# Patient Record
Sex: FEMALE | Race: WHITE | NOT HISPANIC OR LATINO | Employment: OTHER | URBAN - METROPOLITAN AREA
[De-identification: names, ages, dates, MRNs, and addresses within clinical notes are randomized per-mention and may not be internally consistent; named-entity substitution may affect disease eponyms.]

---

## 2017-10-12 ENCOUNTER — APPOINTMENT (OUTPATIENT)
Dept: WOMENS IMAGING | Facility: HOSPITAL | Age: 67
End: 2017-10-12

## 2017-10-12 PROCEDURE — G0202 SCR MAMMO BI INCL CAD: HCPCS | Performed by: RADIOLOGY

## 2017-10-12 PROCEDURE — 77063 BREAST TOMOSYNTHESIS BI: CPT | Performed by: RADIOLOGY

## 2017-10-12 PROCEDURE — MDREVIEWSP: Performed by: RADIOLOGY

## 2018-10-16 ENCOUNTER — APPOINTMENT (OUTPATIENT)
Dept: WOMENS IMAGING | Facility: HOSPITAL | Age: 68
End: 2018-10-16

## 2018-10-16 PROCEDURE — MDREVIEWSP: Performed by: RADIOLOGY

## 2018-10-16 PROCEDURE — 77063 BREAST TOMOSYNTHESIS BI: CPT | Performed by: RADIOLOGY

## 2018-10-16 PROCEDURE — 77067 SCR MAMMO BI INCL CAD: CPT | Performed by: RADIOLOGY

## 2018-10-16 PROCEDURE — 77080 DXA BONE DENSITY AXIAL: CPT | Performed by: RADIOLOGY

## 2019-10-17 ENCOUNTER — APPOINTMENT (OUTPATIENT)
Dept: WOMENS IMAGING | Facility: HOSPITAL | Age: 69
End: 2019-10-17

## 2019-10-17 PROCEDURE — 77063 BREAST TOMOSYNTHESIS BI: CPT | Performed by: RADIOLOGY

## 2019-10-17 PROCEDURE — 77067 SCR MAMMO BI INCL CAD: CPT | Performed by: RADIOLOGY

## 2019-10-17 PROCEDURE — MDREVIEWSP: Performed by: RADIOLOGY

## 2019-10-30 ENCOUNTER — TRANSCRIBE ORDERS (OUTPATIENT)
Dept: ADMINISTRATIVE | Facility: HOSPITAL | Age: 69
End: 2019-10-30

## 2019-10-30 DIAGNOSIS — M54.50 LOW BACK PAIN, UNSPECIFIED BACK PAIN LATERALITY, UNSPECIFIED CHRONICITY, UNSPECIFIED WHETHER SCIATICA PRESENT: Primary | ICD-10-CM

## 2019-10-30 DIAGNOSIS — N18.30 CKD (CHRONIC KIDNEY DISEASE), STAGE III (HCC): ICD-10-CM

## 2019-11-04 ENCOUNTER — APPOINTMENT (OUTPATIENT)
Dept: ULTRASOUND IMAGING | Facility: HOSPITAL | Age: 69
End: 2019-11-04

## 2019-11-18 ENCOUNTER — TRANSCRIBE ORDERS (OUTPATIENT)
Dept: ADMINISTRATIVE | Facility: HOSPITAL | Age: 69
End: 2019-11-18

## 2019-11-18 ENCOUNTER — LAB (OUTPATIENT)
Dept: LAB | Facility: HOSPITAL | Age: 69
End: 2019-11-18

## 2019-11-18 DIAGNOSIS — D72.9 WHITE BLOOD CELL DISORDER: Primary | ICD-10-CM

## 2019-11-18 LAB
BASOPHILS # BLD AUTO: 0.09 10*3/MM3 (ref 0–0.2)
BASOPHILS NFR BLD AUTO: 1.1 % (ref 0–1.5)
DEPRECATED RDW RBC AUTO: 42.5 FL (ref 37–54)
EOSINOPHIL # BLD AUTO: 0.43 10*3/MM3 (ref 0–0.4)
EOSINOPHIL NFR BLD AUTO: 5.3 % (ref 0.3–6.2)
ERYTHROCYTE [DISTWIDTH] IN BLOOD BY AUTOMATED COUNT: 12.7 % (ref 12.3–15.4)
HCT VFR BLD AUTO: 43.3 % (ref 34–46.6)
HGB BLD-MCNC: 14.7 G/DL (ref 12–15.9)
IMM GRANULOCYTES # BLD AUTO: 0.06 10*3/MM3 (ref 0–0.05)
IMM GRANULOCYTES NFR BLD AUTO: 0.7 % (ref 0–0.5)
LYMPHOCYTES # BLD AUTO: 2.67 10*3/MM3 (ref 0.7–3.1)
LYMPHOCYTES NFR BLD AUTO: 33 % (ref 19.6–45.3)
MCH RBC QN AUTO: 30.9 PG (ref 26.6–33)
MCHC RBC AUTO-ENTMCNC: 33.9 G/DL (ref 31.5–35.7)
MCV RBC AUTO: 91.2 FL (ref 79–97)
MONOCYTES # BLD AUTO: 0.74 10*3/MM3 (ref 0.1–0.9)
MONOCYTES NFR BLD AUTO: 9.1 % (ref 5–12)
NEUTROPHILS # BLD AUTO: 4.1 10*3/MM3 (ref 1.7–7)
NEUTROPHILS NFR BLD AUTO: 50.8 % (ref 42.7–76)
NRBC BLD AUTO-RTO: 0 /100 WBC (ref 0–0.2)
PLATELET # BLD AUTO: 252 10*3/MM3 (ref 140–450)
PMV BLD AUTO: 9.8 FL (ref 6–12)
RBC # BLD AUTO: 4.75 10*6/MM3 (ref 3.77–5.28)
WBC NRBC COR # BLD: 8.09 10*3/MM3 (ref 3.4–10.8)

## 2019-11-18 PROCEDURE — 36415 COLL VENOUS BLD VENIPUNCTURE: CPT

## 2019-11-18 PROCEDURE — 85025 COMPLETE CBC W/AUTO DIFF WBC: CPT

## 2020-06-18 ENCOUNTER — TRANSCRIBE ORDERS (OUTPATIENT)
Dept: ADMINISTRATIVE | Facility: HOSPITAL | Age: 70
End: 2020-06-18

## 2020-06-18 DIAGNOSIS — G51.0 BELL'S PALSY: Primary | ICD-10-CM

## 2020-06-19 ENCOUNTER — HOSPITAL ENCOUNTER (OUTPATIENT)
Dept: MRI IMAGING | Facility: HOSPITAL | Age: 70
Discharge: HOME | End: 2020-06-19
Admitting: INTERNAL MEDICINE

## 2020-06-19 DIAGNOSIS — G51.0 BELL'S PALSY: ICD-10-CM

## 2020-06-19 PROCEDURE — 0 GADOBENATE DIMEGLUMINE 529 MG/ML SOLUTION: Performed by: INTERNAL MEDICINE

## 2020-06-19 PROCEDURE — A9577 INJ MULTIHANCE: HCPCS | Performed by: INTERNAL MEDICINE

## 2020-06-19 PROCEDURE — 70553 MRI BRAIN STEM W/O & W/DYE: CPT

## 2020-06-19 RX ADMIN — GADOBENATE DIMEGLUMINE 17 ML: 529 INJECTION, SOLUTION INTRAVENOUS at 12:52

## 2020-06-19 NOTE — NURSING NOTE
"1316 patient to triage bay 9, compliant with her mask, I am wearing a mask and eye protection to care for her. She is a hold and call patient here.  1343 dr. Blanchard phoned here and stated \"nothing acute, it is bells palsy, ok to go\" per Miriam Camargo, Rn  1345 IV out by Addie Kwong RN, patient ambulatory toward discharge now.  "

## 2021-03-03 DIAGNOSIS — Z23 IMMUNIZATION DUE: ICD-10-CM

## 2021-05-26 ENCOUNTER — APPOINTMENT (OUTPATIENT)
Dept: WOMENS IMAGING | Facility: HOSPITAL | Age: 71
End: 2021-05-26

## 2021-05-26 PROCEDURE — 77067 SCR MAMMO BI INCL CAD: CPT | Performed by: RADIOLOGY

## 2021-05-26 PROCEDURE — 77063 BREAST TOMOSYNTHESIS BI: CPT | Performed by: RADIOLOGY

## 2022-01-21 ENCOUNTER — OFFICE VISIT (OUTPATIENT)
Dept: SPORTS MEDICINE | Facility: CLINIC | Age: 72
End: 2022-01-21

## 2022-01-21 VITALS
HEART RATE: 72 BPM | BODY MASS INDEX: 28.32 KG/M2 | OXYGEN SATURATION: 95 % | DIASTOLIC BLOOD PRESSURE: 60 MMHG | TEMPERATURE: 97.8 F | WEIGHT: 170 LBS | RESPIRATION RATE: 16 BRPM | SYSTOLIC BLOOD PRESSURE: 128 MMHG | HEIGHT: 65 IN

## 2022-01-21 DIAGNOSIS — G89.29 CHRONIC PAIN OF RIGHT KNEE: Primary | ICD-10-CM

## 2022-01-21 DIAGNOSIS — M25.561 CHRONIC PAIN OF RIGHT KNEE: Primary | ICD-10-CM

## 2022-01-21 DIAGNOSIS — M17.11 PRIMARY OSTEOARTHRITIS OF RIGHT KNEE: ICD-10-CM

## 2022-01-21 PROCEDURE — 73562 X-RAY EXAM OF KNEE 3: CPT | Performed by: FAMILY MEDICINE

## 2022-01-21 PROCEDURE — 99214 OFFICE O/P EST MOD 30 MIN: CPT | Performed by: FAMILY MEDICINE

## 2022-01-21 NOTE — PROGRESS NOTES
"Tiff is a 71 y.o. year old female presents to [facility]    Chief Complaint   Patient presents with   • Knee Pain     eval for RT knee pain with NKI - pain for about 5-6 weeks - pain is on and off at times, worse when trying to get up from sitting and prolonged ambulation - no other sxs reported - here today with new x-rays for further evaluation and treatment        History of Present Illness  Onset approximately 6 weeks ago.  She does have chronic right knee pain but this acutely exacerbated over the past 6 weeks.  She states that she was seen by [facility] a few years ago and was told she had arthritis.  She since then has been on and off taking meloxicam, more so recently since the flareup of her pain.  She noticed stiffness and pain below the kneecap especially when she bends her knee over nighttime.  When she gets it moving, she states that her symptoms resolved.  She does not endorse any true instability though occasionally does associate some relative instability.  No swelling.    I have reviewed the patient's medical, family, and social history in detail and updated the computerized patient record.    /60 (BP Location: Left arm, Patient Position: Sitting, Cuff Size: Adult)   Pulse 72   Temp 97.8 °F (36.6 °C) (Temporal)   Resp 16   Ht 165.1 cm (65\")   Wt 77.1 kg (170 lb)   SpO2 95%   BMI 28.29 kg/m²      Physical Exam    Mask worn thru encounter  Vital signs reviewed.   General: No acute distress.  Eyes: conjunctiva clear; pupils equally round and reactive  ENT: external ears atraumatic  CV: no peripheral edema  Resp: normal respiratory effort, no use of accessory muscles  Skin: no rashes or wounds; normal turgor  Psych: mood and affect appropriate; recent and remote memory intact  Neuro: sensation to light touch intact    MSK Exam  R knee: No effusion.  Full range of motion.  Positive retropatellar crepitus.  Negative medial, negative lateral [place].  " Pseudolaxity with valgus stress though there is good endpoint.  There is no laxity with varus stress.    Right Knee X-Ray  Indication: Pain    Views: AP, Lateral, and [place]    Findings:  No fracture  No bony lesion  Normal soft tissues  Patellofemoral, medial OA with osteophyte formation along the superior pole the patella as well as along the medial femoral condyle, medial tibial plateau    No prior studies were available for comparison.           Diagnoses and all orders for this visit:    Chronic pain of right knee  -     XR Knee 3+ View With [place] Right    Primary osteoarthritis of right knee    Clinically presents with knee osteoarthritis.  Discussed x-ray findings with patient.  She seems to be doing well functionally and pain is controlled with as needed meloxicam.  This is managed by her primary care.  She will continue with this treatment but if anything changes, she can RTO for further options.      Follow Up   Return if symptoms worsen or fail to improve.  Patient was given instructions and counseling regarding her condition or for health maintenance advice. Please see specific information pulled into the AVS if appropriate.     EMR Dragon/Transcription disclaimer:    Much of this encounter note is an electronic transcription/translation of spoken language to printed text.  The electronic translation of spoken language may permit erroneous, or at times, nonsensical words or phrases to be inadvertently transcribed.  Although I have reviewed the note for such errors some may still exist.    [] : Resident [FreeTextEntry3] : 2nd dose of PNC G\par RTC in one week\par \par The need for partner treatment before restarting sexual activity discussed again

## 2022-07-26 ENCOUNTER — TREATMENT (OUTPATIENT)
Dept: PHYSICAL THERAPY | Facility: CLINIC | Age: 72
End: 2022-07-26

## 2022-07-26 DIAGNOSIS — M25.571 ACUTE RIGHT ANKLE PAIN: ICD-10-CM

## 2022-07-26 DIAGNOSIS — R26.9 GAIT DISTURBANCE: ICD-10-CM

## 2022-07-26 DIAGNOSIS — Z87.81 STATUS POST OPEN REDUCTION WITH INTERNAL FIXATION (ORIF) OF FRACTURE OF ANKLE: ICD-10-CM

## 2022-07-26 DIAGNOSIS — S93.431A ANKLE SYNDESMOSIS DISRUPTION, RIGHT, INITIAL ENCOUNTER: Primary | ICD-10-CM

## 2022-07-26 DIAGNOSIS — Z98.890 STATUS POST OPEN REDUCTION WITH INTERNAL FIXATION (ORIF) OF FRACTURE OF ANKLE: ICD-10-CM

## 2022-07-26 PROCEDURE — 97162 PT EVAL MOD COMPLEX 30 MIN: CPT | Performed by: PHYSICAL THERAPIST

## 2022-07-26 PROCEDURE — 97140 MANUAL THERAPY 1/> REGIONS: CPT | Performed by: PHYSICAL THERAPIST

## 2022-07-26 PROCEDURE — 97110 THERAPEUTIC EXERCISES: CPT | Performed by: PHYSICAL THERAPIST

## 2022-07-26 NOTE — PROGRESS NOTES
Physical Therapy Initial Evaluation and Plan of Care    Patient: Tiff See   : 1950  Diagnosis/ICD-10 Code:  Ankle syndesmosis disruption, right, initial encounter [S93.431A]  Referring practitioner: No ref. provider found  Date of Initial Visit: 2022  Today's Date: 2022  Patient seen for 1 sessions           Subjective Questionnaire: LEFS:       Subjective Evaluation    History of Present Illness  Date of surgery: 6/3/2022  Mechanism of injury: Pt is a 73 y/o female who presents to physical therapy with c/o R ankle pain s/p R ankle ORIF with external fixation. Pt had external fixation removed and presents to PT in a wheelchair and a boot. Pt is currently NWB.     Pain  Current pain ratin  At worst pain ratin  Quality: dull ache  Relieving factors: rest and ice  Aggravating factors: movement, standing, ambulation, stairs and squatting    Social Support  Lives with: spouse    Treatments  Current treatment: physical therapy  Patient Goals  Patient goals for therapy: decreased edema, decreased pain, improved balance, increased motion, increased strength, independence with ADLs/IADLs and return to work             Objective          Passive Range of Motion   Left Ankle/Foot    Dorsiflexion (ke): 15 degrees   Plantar flexion: 40 degrees   Inversion: 48 degrees   Eversion: 35 degrees     Right Ankle/Foot    Dorsiflexion (ke): 8 degrees with pain  Plantar flexion: 35 degrees   Inversion: 30 degrees   Eversion: 10 degrees with pain    Strength/Myotome Testing     Left Hip   Planes of Motion   Flexion: 4-    Right Hip   Planes of Motion   Flexion: 4-    Left Knee   Flexion: 4+  Extension: 4-    Right Knee   Flexion: 4+  Extension: 4-    Left Ankle/Foot   Dorsiflexion: 5  Plantar flexion: 5  Inversion: 5  Eversion: 5    Additional Strength Details  RLE MMT DNT will test when appropriate    Ambulation   Weight-Bearing Status   Weight-Bearing Status (Left): full weight bearing    Weight-Bearing Status (Right): non-weight-bearing    Assistive device used: wheelchair    Ambulation: Level Surfaces   Ambulation with assistive device: unable  Ambulation without assistive device: unable    Ambulation: Stairs   Ascend stairs: unable  Descend stairs: unable          Assessment & Plan     Assessment  Impairments: abnormal gait, abnormal muscle tone, abnormal or restricted ROM, activity intolerance, impaired balance, impaired physical strength, lacks appropriate home exercise program, pain with function and weight-bearing intolerance  Functional Limitations: carrying objects, lifting, walking, uncomfortable because of pain, standing and unable to perform repetitive tasks  Assessment details: Pt is a 71 y/o female who presents to physical therapy with signs and symptoms appropriate at this stage s/p R ankle ORIF.  Pt has decreased R ankle ROM compared to unaffected side. In addition, pt is NWB which limits her ability to perform her ADLs/IADLs pain free. Pt has pain and is unable to ambulate, and transfer due to post op restrictions. Pt would benefit from skilled physical therapy in order to address the above impairments and activity limitations outlined in this initial evaluation, in order to decrease pain, improve functional mobility, and return to PLOF.   Prognosis: good    Goals  Plan Goals: STG 2-4 weeks    1. LEFS score to increase by 10 points in order to show improvement in overall functional activities.   2. Pt will be able to improve ankle DF ROM to 12 degrees in order to progress mobility for ambulation in loading  3. Pt will be independent in home exercise program    LTG 4-6 weeks    1. Pt will have normal R ankle PROM in order to improve functional mobility  2. Pt will be able to ambulate with FWW 25 feet with good form and stability with step through gait pattern  3. Pt will be able to perform 5TSTS using hands in <30 seconds      Plan  Therapy options: will be seen for skilled therapy  services  Planned modality interventions: cryotherapy  Planned therapy interventions: balance/weight-bearing training, flexibility, functional ROM exercises, ADL retraining, home exercise program, gait training, IADL retraining, joint mobilization, manual therapy, neuromuscular re-education, soft tissue mobilization, strengthening, stretching and therapeutic activities  Duration in weeks: 12  Treatment plan discussed with: patient        Manual Therapy:    8     mins  79809;  Therapeutic Exercise:    18     mins  30603;     Neuromuscular Robb:    0    mins  73460;    Therapeutic Activity:     0     mins  20634;     Gait Trainin     mins  34175;     Ultrasound:     0     mins  17442;    Electrical Stimulation:    0     mins  88449 ( );  Dry Needling     0     mins self-pay    Timed Treatment:   26   mins   Total Treatment:     45   mins    PT SIGNATURE: Valery Avila, GOOD   DATE TREATMENT INITIATED: 2022    Initial Certification  Certification Period: 10/25/2022  I certify that the therapy services are furnished while this patient is under my care.  The services outlined above are required by this patient, and will be reviewed every 90 days.       Please sign and return via fax to [phone].. Thank you, [facility].

## 2022-07-28 ENCOUNTER — TELEPHONE (OUTPATIENT)
Dept: URGENT CARE | Facility: CLINIC | Age: 72
End: 2022-07-28

## 2022-08-02 ENCOUNTER — TREATMENT (OUTPATIENT)
Dept: PHYSICAL THERAPY | Facility: CLINIC | Age: 72
End: 2022-08-02

## 2022-08-02 DIAGNOSIS — M25.571 ACUTE RIGHT ANKLE PAIN: ICD-10-CM

## 2022-08-02 DIAGNOSIS — Z87.81 STATUS POST OPEN REDUCTION WITH INTERNAL FIXATION (ORIF) OF FRACTURE OF ANKLE: ICD-10-CM

## 2022-08-02 DIAGNOSIS — S93.431A ANKLE SYNDESMOSIS DISRUPTION, RIGHT, INITIAL ENCOUNTER: Primary | ICD-10-CM

## 2022-08-02 DIAGNOSIS — R26.9 GAIT DISTURBANCE: ICD-10-CM

## 2022-08-02 DIAGNOSIS — Z98.890 STATUS POST OPEN REDUCTION WITH INTERNAL FIXATION (ORIF) OF FRACTURE OF ANKLE: ICD-10-CM

## 2022-08-02 PROCEDURE — 97110 THERAPEUTIC EXERCISES: CPT | Performed by: PHYSICAL THERAPIST

## 2022-08-02 PROCEDURE — 97112 NEUROMUSCULAR REEDUCATION: CPT | Performed by: PHYSICAL THERAPIST

## 2022-08-02 PROCEDURE — 97140 MANUAL THERAPY 1/> REGIONS: CPT | Performed by: PHYSICAL THERAPIST

## 2022-08-02 NOTE — PROGRESS NOTES
Physical Therapy Daily Progress Note        Patient: Tiff See   : 1950  Diagnosis/ICD-10 Code:  Ankle syndesmosis disruption, right, initial encounter [S93.431A]  Referring practitioner: Spenser Stevens MD  Date of Initial Visit: Type: THERAPY  Noted: 2022  Today's Date: 2022  Patient seen for 2 sessions         Tiff See reports:         Subjective   Pt states that her ankle is doing okay, that it is a little stiff  Objective   See Exercise, Manual, and Modality Logs for complete treatment.       Assessment/Plan  Pt instructed to ice at home after performing home exercises and to elevate LE. Pt showed good understanding of HEP and guidelines. Pt did a nice job of supine activities and is following NWB protocol. Pt sees MD in one week for follow up. Continue POC in order to strengthen LE in NWB, as well as, improve overall ankle mobility. Continue POC.  Progress per Plan of Care           Manual Therapy:    8     mins  27761;  Therapeutic Exercise:    20     mins  60557;     Neuromuscular Robb:    12    mins  63079;    Therapeutic Activity:     0     mins  04378;     Gait Trainin     mins  89585;     Ultrasound:     0     mins  08246;    Electrical Stimulation:    0     mins  63901 ( );  Dry Needling     0     mins self-pay    Timed Treatment:   40   mins   Total Treatment:     50   mins    Valery Avila PT  Physical Therapist

## 2022-08-04 ENCOUNTER — TREATMENT (OUTPATIENT)
Dept: PHYSICAL THERAPY | Facility: CLINIC | Age: 72
End: 2022-08-04

## 2022-08-04 DIAGNOSIS — R26.9 GAIT DISTURBANCE: ICD-10-CM

## 2022-08-04 DIAGNOSIS — M25.571 ACUTE RIGHT ANKLE PAIN: ICD-10-CM

## 2022-08-04 DIAGNOSIS — Z87.81 STATUS POST OPEN REDUCTION WITH INTERNAL FIXATION (ORIF) OF FRACTURE OF ANKLE: ICD-10-CM

## 2022-08-04 DIAGNOSIS — Z98.890 STATUS POST OPEN REDUCTION WITH INTERNAL FIXATION (ORIF) OF FRACTURE OF ANKLE: ICD-10-CM

## 2022-08-04 DIAGNOSIS — S93.431A ANKLE SYNDESMOSIS DISRUPTION, RIGHT, INITIAL ENCOUNTER: Primary | ICD-10-CM

## 2022-08-04 PROCEDURE — 97112 NEUROMUSCULAR REEDUCATION: CPT | Performed by: PHYSICAL THERAPIST

## 2022-08-04 PROCEDURE — 97110 THERAPEUTIC EXERCISES: CPT | Performed by: PHYSICAL THERAPIST

## 2022-08-04 PROCEDURE — 97140 MANUAL THERAPY 1/> REGIONS: CPT | Performed by: PHYSICAL THERAPIST

## 2022-08-04 NOTE — PROGRESS NOTES
"   Physical Therapy Daily Progress Note        Patient: Tiff See   : 1950  Diagnosis/ICD-10 Code:  Ankle syndesmosis disruption, right, initial encounter [S93.431A]  Referring practitioner: Spenser Stevnes MD  Date of Initial Visit: Type: THERAPY  Noted: 2022  Today's Date: 2022  Patient seen for 3 sessions         Tiff See reports:         Subjective   Pt states \"I was sore after last time, but just the day after\"  Objective   See Exercise, Manual, and Modality Logs for complete treatment.       Assessment/Plan  Pt tolerates treatment well. Pt progressed in gentle strengthening with ankle four-way activity. Pt required verbal cueing to lead with toes during ankle inversion/eversion to prevent hip rotation compensation. Pt able to correct with cue, has the most difficulty with resisted eversion. Pt reported no pain throughout activity. Continue to progress towards goals.   Progress per Plan of Care           Manual Therapy:    8     mins  68901;  Therapeutic Exercise:    20     mins  87475;     Neuromuscular Robb:    15    mins  12260;    Therapeutic Activity:     0     mins  77194;     Gait Trainin     mins  91132;     Ultrasound:     0     mins  65961;    Electrical Stimulation:    0     mins  15120 ( );  Dry Needling     0     mins self-pay    Timed Treatment:   43   mins   Total Treatment:     51   mins    Valery Avila PT  Physical Therapist                    "

## 2022-08-11 ENCOUNTER — TREATMENT (OUTPATIENT)
Dept: PHYSICAL THERAPY | Facility: CLINIC | Age: 72
End: 2022-08-11

## 2022-08-11 DIAGNOSIS — R26.9 GAIT DISTURBANCE: ICD-10-CM

## 2022-08-11 DIAGNOSIS — Z87.81 STATUS POST OPEN REDUCTION WITH INTERNAL FIXATION (ORIF) OF FRACTURE OF ANKLE: ICD-10-CM

## 2022-08-11 DIAGNOSIS — S93.431A ANKLE SYNDESMOSIS DISRUPTION, RIGHT, INITIAL ENCOUNTER: Primary | ICD-10-CM

## 2022-08-11 DIAGNOSIS — M25.571 ACUTE RIGHT ANKLE PAIN: ICD-10-CM

## 2022-08-11 DIAGNOSIS — Z98.890 STATUS POST OPEN REDUCTION WITH INTERNAL FIXATION (ORIF) OF FRACTURE OF ANKLE: ICD-10-CM

## 2022-08-11 PROCEDURE — 97530 THERAPEUTIC ACTIVITIES: CPT | Performed by: PHYSICAL THERAPIST

## 2022-08-11 PROCEDURE — 97110 THERAPEUTIC EXERCISES: CPT | Performed by: PHYSICAL THERAPIST

## 2022-08-11 PROCEDURE — 97116 GAIT TRAINING THERAPY: CPT | Performed by: PHYSICAL THERAPIST

## 2022-08-11 NOTE — PROGRESS NOTES
Physical Therapy Daily Progress Note    Patient: Tiff See   : 1950  Diagnosis/ICD-10 Code:  Ankle syndesmosis disruption, right, initial encounter [S93.431A]  Referring practitioner: Spenser Stevens MD  Date of Initial Visit: Type: THERAPY  Noted: 2022  Today's Date: 2022  Patient seen for 4 sessions         Tiff See reports: I went to the doctor this week and got new orders. I am able to start weightbearing. (Order scanned in chart). Not having any pain anymore, just feeling tightness.    Subjective     Objective   See Exercise, Manual, and Modality Logs for complete treatment.       Assessment/Plan  Subjectively, pt reports no increase of pain or discomfort with interventions performed today. Performed well with addition of weight shift activities with boot on in parallel bars. Continues to demonstrate limited ankle mobility especially in DF and EV planes. Continues to benefit from verbal/tactile cues to ensure proper form and technique for exercise performance.     Progress per Plan of Care           Manual Therapy:         mins  72058;  Therapeutic Exercise:    25     mins  06148;     Neuromuscular Robb:        mins  75638;    Therapeutic Activity:     15     mins  75010;     Gait Trainin     mins  69313;     Ultrasound:          mins  63852;    Electrical Stimulation:         mins  02453 ( );  Dry Needling          mins self-pay    Timed Treatment:  48    mins   Total Treatment:     53   mins    Darlin Culp PTA  Physical Therapist Assistant [id number]

## 2022-08-17 ENCOUNTER — TREATMENT (OUTPATIENT)
Dept: PHYSICAL THERAPY | Facility: CLINIC | Age: 72
End: 2022-08-17

## 2022-08-17 DIAGNOSIS — Z98.890 STATUS POST OPEN REDUCTION WITH INTERNAL FIXATION (ORIF) OF FRACTURE OF ANKLE: ICD-10-CM

## 2022-08-17 DIAGNOSIS — Z87.81 STATUS POST OPEN REDUCTION WITH INTERNAL FIXATION (ORIF) OF FRACTURE OF ANKLE: ICD-10-CM

## 2022-08-17 DIAGNOSIS — R26.9 GAIT DISTURBANCE: ICD-10-CM

## 2022-08-17 DIAGNOSIS — M25.571 ACUTE RIGHT ANKLE PAIN: ICD-10-CM

## 2022-08-17 DIAGNOSIS — S93.431A ANKLE SYNDESMOSIS DISRUPTION, RIGHT, INITIAL ENCOUNTER: Primary | ICD-10-CM

## 2022-08-17 PROCEDURE — 97110 THERAPEUTIC EXERCISES: CPT | Performed by: PHYSICAL THERAPIST

## 2022-08-17 PROCEDURE — 97140 MANUAL THERAPY 1/> REGIONS: CPT | Performed by: PHYSICAL THERAPIST

## 2022-08-17 PROCEDURE — 97116 GAIT TRAINING THERAPY: CPT | Performed by: PHYSICAL THERAPIST

## 2022-08-17 NOTE — PROGRESS NOTES
"   Physical Therapy Daily Progress Note        Patient: Tiff See   : 1950  Diagnosis/ICD-10 Code:  Ankle syndesmosis disruption, right, initial encounter [S93.431A]  Referring practitioner: Spenser Stevens MD  Date of Initial Visit: Type: THERAPY  Noted: 2022  Today's Date: 2022  Patient seen for 5 sessions         Tiff See reports:         Subjective   Pt states \"It is feeling ok today, I was sore after last time\"  Objective   See Exercise, Manual, and Modality Logs for complete treatment.       Assessment/Plan  Pt brought cane to today's session. Pt educated on how to safely ambulate with her SPC while wearing CAM boot in parallel bars. Pt educated on proper step to pattern using cane. Pt has apprehension putting weight through boot, instructed for small transfers a couple of steps to the bathroom to use walker until she improves with her cane in PT. HEP updated to include supine bridges and resisted clamshells. Continue POC.   Progress per Plan of Care           Manual Therapy:    10     mins  95729;  Therapeutic Exercise:    20     mins  65193;     Neuromuscular Robb:    0    mins  14598;    Therapeutic Activity:     10     mins  18785;     Gait Trainin     mins  31303;     Ultrasound:     0     mins  66856;    Electrical Stimulation:    0     mins  79775 ( );  Dry Needling     0     mins self-pay    Timed Treatment:   45   mins   Total Treatment:     45   mins    Valery Avila, PT  Physical Therapist                    "

## 2022-08-22 ENCOUNTER — TREATMENT (OUTPATIENT)
Dept: PHYSICAL THERAPY | Facility: CLINIC | Age: 72
End: 2022-08-22

## 2022-08-22 DIAGNOSIS — R26.9 GAIT DISTURBANCE: ICD-10-CM

## 2022-08-22 DIAGNOSIS — Z87.81 STATUS POST OPEN REDUCTION WITH INTERNAL FIXATION (ORIF) OF FRACTURE OF ANKLE: ICD-10-CM

## 2022-08-22 DIAGNOSIS — M25.571 ACUTE RIGHT ANKLE PAIN: ICD-10-CM

## 2022-08-22 DIAGNOSIS — Z98.890 STATUS POST OPEN REDUCTION WITH INTERNAL FIXATION (ORIF) OF FRACTURE OF ANKLE: ICD-10-CM

## 2022-08-22 DIAGNOSIS — S93.431A ANKLE SYNDESMOSIS DISRUPTION, RIGHT, INITIAL ENCOUNTER: Primary | ICD-10-CM

## 2022-08-22 PROCEDURE — 97530 THERAPEUTIC ACTIVITIES: CPT | Performed by: PHYSICAL THERAPIST

## 2022-08-22 PROCEDURE — 97110 THERAPEUTIC EXERCISES: CPT | Performed by: PHYSICAL THERAPIST

## 2022-08-22 PROCEDURE — 97140 MANUAL THERAPY 1/> REGIONS: CPT | Performed by: PHYSICAL THERAPIST

## 2022-08-22 NOTE — PROGRESS NOTES
"   Physical Therapy Daily Progress Note        Patient: Tiff See   : 1950  Diagnosis/ICD-10 Code:  Ankle syndesmosis disruption, right, initial encounter [S93.431A]  Referring practitioner: Spenser Stevens MD  Date of Initial Visit: Type: THERAPY  Noted: 2022  Today's Date: 2022  Patient seen for 6 sessions         Tiff See reports:         Subjective   Pt states \"I have been trying to put more weight through it\"  Objective   See Exercise, Manual, and Modality Logs for complete treatment.       Assessment/Plan  Pt continues to show improvement in overall WB tolerance during standing activities in CAM boot. During stagger weight shift, she had the most difficulty during loading due to c/o R knee discomfort when R foot as lead leg. Continue to progress WB and progress strengthening as tolerated.   Progress per Plan of Care           Manual Therapy:    15     mins  00885;  Therapeutic Exercise:    15     mins  87503;     Neuromuscular Robb:    0    mins  21586;    Therapeutic Activity:     10     mins  48417;     Gait Trainin     mins  94869;     Ultrasound:     0     mins  86684;    Electrical Stimulation:    0     mins  29779 ( );  Dry Needling     0     mins self-pay    Timed Treatment:   45   mins   Total Treatment:     45   mins    Valery Avila, PT  Physical Therapist                    "

## 2022-08-24 ENCOUNTER — TRANSCRIBE ORDERS (OUTPATIENT)
Dept: WOMENS IMAGING | Facility: HOSPITAL | Age: 72
End: 2022-08-24

## 2022-08-24 ENCOUNTER — HOSPITAL ENCOUNTER (OUTPATIENT)
Dept: PET IMAGING | Facility: HOSPITAL | Age: 72
Discharge: HOME | End: 2022-08-24

## 2022-08-24 ENCOUNTER — APPOINTMENT (OUTPATIENT)
Dept: WOMENS IMAGING | Facility: HOSPITAL | Age: 72
End: 2022-08-24

## 2022-08-24 DIAGNOSIS — M81.0 HIGH RISK FOR FRACTURE DUE TO OSTEOPOROSIS BY DEXA SCAN: Primary | ICD-10-CM

## 2022-08-24 PROCEDURE — 77080 DXA BONE DENSITY AXIAL: CPT

## 2022-08-24 PROCEDURE — 77067 SCR MAMMO BI INCL CAD: CPT | Performed by: RADIOLOGY

## 2022-08-24 PROCEDURE — 77080 DXA BONE DENSITY AXIAL: CPT | Performed by: RADIOLOGY

## 2022-08-24 PROCEDURE — 77063 BREAST TOMOSYNTHESIS BI: CPT | Performed by: RADIOLOGY

## 2022-08-25 ENCOUNTER — TREATMENT (OUTPATIENT)
Dept: PHYSICAL THERAPY | Facility: CLINIC | Age: 72
End: 2022-08-25

## 2022-08-25 DIAGNOSIS — R26.9 GAIT DISTURBANCE: ICD-10-CM

## 2022-08-25 DIAGNOSIS — Z98.890 STATUS POST OPEN REDUCTION WITH INTERNAL FIXATION (ORIF) OF FRACTURE OF ANKLE: ICD-10-CM

## 2022-08-25 DIAGNOSIS — M25.571 ACUTE RIGHT ANKLE PAIN: ICD-10-CM

## 2022-08-25 DIAGNOSIS — S93.431A ANKLE SYNDESMOSIS DISRUPTION, RIGHT, INITIAL ENCOUNTER: Primary | ICD-10-CM

## 2022-08-25 DIAGNOSIS — Z87.81 STATUS POST OPEN REDUCTION WITH INTERNAL FIXATION (ORIF) OF FRACTURE OF ANKLE: ICD-10-CM

## 2022-08-25 PROCEDURE — 97140 MANUAL THERAPY 1/> REGIONS: CPT | Performed by: PHYSICAL THERAPIST

## 2022-08-25 PROCEDURE — 97530 THERAPEUTIC ACTIVITIES: CPT | Performed by: PHYSICAL THERAPIST

## 2022-08-25 PROCEDURE — 97110 THERAPEUTIC EXERCISES: CPT | Performed by: PHYSICAL THERAPIST

## 2022-08-25 NOTE — PROGRESS NOTES
"   Physical Therapy Daily Progress Note        Patient: Tiff See   : 1950  Diagnosis/ICD-10 Code:  Ankle syndesmosis disruption, right, initial encounter [S93.431A]  Referring practitioner: Spenser Stevens MD  Date of Initial Visit: Type: THERAPY  Noted: 2022  Today's Date: 2022  Patient seen for 7 sessions         Tiff See reports:         Subjective   Pt states \"it is feeling pretty good, but it is a little sore from me massaging it I think\"  Objective   See Exercise, Manual, and Modality Logs for complete treatment.       Assessment/Plan  Pt able to ambulate in parallel bars, wearing CAM boot and using SPC using step through pattern. Pt benefited from intermittent verbal cueing for proper gait pattern form. Pt able to correct with cue. Pt showed improved mobility into ankle inversion and eversion on RLE, she continues to benefit from manual therapy to help improve mobility.  Progress per Plan of Care           Manual Therapy:    15     mins  10745;  Therapeutic Exercise:    12     mins  64622;     Neuromuscular Robb:    0    mins  84319;    Therapeutic Activity:     10     mins  57334;     Gait Trainin     mins  06544;     Ultrasound:     0     mins  90542;    Electrical Stimulation:    0     mins  72085 ( );  Dry Needling     0     mins self-pay    Timed Treatment:   42   mins   Total Treatment:     42   mins    Valery Avila PT  Physical Therapist                    "

## 2022-08-29 ENCOUNTER — TREATMENT (OUTPATIENT)
Dept: PHYSICAL THERAPY | Facility: CLINIC | Age: 72
End: 2022-08-29

## 2022-08-29 DIAGNOSIS — Z98.890 STATUS POST OPEN REDUCTION WITH INTERNAL FIXATION (ORIF) OF FRACTURE OF ANKLE: ICD-10-CM

## 2022-08-29 DIAGNOSIS — S93.431A ANKLE SYNDESMOSIS DISRUPTION, RIGHT, INITIAL ENCOUNTER: Primary | ICD-10-CM

## 2022-08-29 DIAGNOSIS — Z87.81 STATUS POST OPEN REDUCTION WITH INTERNAL FIXATION (ORIF) OF FRACTURE OF ANKLE: ICD-10-CM

## 2022-08-29 DIAGNOSIS — M25.571 ACUTE RIGHT ANKLE PAIN: ICD-10-CM

## 2022-08-29 DIAGNOSIS — R26.9 GAIT DISTURBANCE: ICD-10-CM

## 2022-08-29 PROCEDURE — 97140 MANUAL THERAPY 1/> REGIONS: CPT | Performed by: PHYSICAL THERAPIST

## 2022-08-29 PROCEDURE — 97110 THERAPEUTIC EXERCISES: CPT | Performed by: PHYSICAL THERAPIST

## 2022-08-29 PROCEDURE — 97530 THERAPEUTIC ACTIVITIES: CPT | Performed by: PHYSICAL THERAPIST

## 2022-08-29 NOTE — PROGRESS NOTES
"   Physical Therapy Daily Progress Note        Patient: Tiff See   : 1950  Diagnosis/ICD-10 Code:  Ankle syndesmosis disruption, right, initial encounter [S93.431A]  Referring practitioner: Spenser Stevens MD  Date of Initial Visit: Type: THERAPY  Noted: 2022  Today's Date: 2022  Patient seen for 8 sessions         Tiff See reports:         Subjective   Pt states \"it's getting better\"  Objective   See Exercise, Manual, and Modality Logs for complete treatment.       Assessment/Plan  Pt able to ascend 4 inch stair step using BUE support in parallel bars with CAM boot and descend stair step without LOB and good form. Pt had no adverse reactions with progression. She benefits from verbal cueing for proper form and gait pattern up/down stair step. Continue to progress WB in boot.   Progress per Plan of Care           Manual Therapy:    8     mins  07292;  Therapeutic Exercise:    15     mins  42863;     Neuromuscular Robb:    0    mins  47240;    Therapeutic Activity:     10     mins  50404;     Gait Trainin     mins  77026;     Ultrasound:     0     mins  80393;    Electrical Stimulation:    0     mins  40452 ( );  Dry Needling     0     mins self-pay    Timed Treatment:   38   mins   Total Treatment:     38   mins    Valery Avila PT  Physical Therapist                    "

## 2022-09-01 ENCOUNTER — TREATMENT (OUTPATIENT)
Dept: PHYSICAL THERAPY | Facility: CLINIC | Age: 72
End: 2022-09-01

## 2022-09-01 DIAGNOSIS — Z98.890 STATUS POST OPEN REDUCTION WITH INTERNAL FIXATION (ORIF) OF FRACTURE OF ANKLE: ICD-10-CM

## 2022-09-01 DIAGNOSIS — S93.431A ANKLE SYNDESMOSIS DISRUPTION, RIGHT, INITIAL ENCOUNTER: Primary | ICD-10-CM

## 2022-09-01 DIAGNOSIS — M25.571 ACUTE RIGHT ANKLE PAIN: ICD-10-CM

## 2022-09-01 DIAGNOSIS — R26.9 GAIT DISTURBANCE: ICD-10-CM

## 2022-09-01 DIAGNOSIS — Z87.81 STATUS POST OPEN REDUCTION WITH INTERNAL FIXATION (ORIF) OF FRACTURE OF ANKLE: ICD-10-CM

## 2022-09-01 PROCEDURE — 97140 MANUAL THERAPY 1/> REGIONS: CPT | Performed by: PHYSICAL THERAPIST

## 2022-09-01 PROCEDURE — 97110 THERAPEUTIC EXERCISES: CPT | Performed by: PHYSICAL THERAPIST

## 2022-09-01 NOTE — PROGRESS NOTES
"Re-Assessment / Re-Certification        Patient: Tiff See   : 1950  Diagnosis/ICD-10 Code:  Ankle syndesmosis disruption, right, initial encounter [S93.431A]  Referring practitioner: Spenser Stevens MD  Date of Initial Visit: Type: THERAPY  Noted: 2022  Today's Date: 2022  Patient seen for 9 sessions      Subjective:   Tiff See reports:   Subjective Questionnaire: LEFS: 33/80  Clinical Progress: improved  Home Program Compliance: Yes  Treatment has included: therapeutic exercise, neuromuscular re-education, manual therapy, therapeutic activity, gait training and cryotherapy    Subjective   Pt states \"I have really been working on my mobility\"  Objective   Passive Range of Motion   Left Ankle/Foot    Dorsiflexion (ke): 15 degrees   Plantar flexion: 40 degrees   Inversion: 48 degrees   Eversion: 35 degrees      Right Ankle/Foot    Dorsiflexion (ke): 10 degrees   Plantar flexion: 35 degrees   Inversion: 45 degrees   Eversion: 25 degrees      Strength/Myotome Testing      Left Hip   Planes of Motion   Flexion: 5/5     Right Hip   Planes of Motion   Flexion: 5/5     Left Knee   Flexion: 5/5  Extension: 5/5     Right Knee   Flexion: 5/5  Extension: 4+/5     Left Ankle/Foot   Dorsiflexion: 5  Plantar flexion: 5  Inversion: 5  Eversion: 5    R Ankle/Foot  Dorsiflexion: 4+/5  Plantar flexion: 4+/5  Inversion: 3+/5  Eversion: 4/5    Ambulation   Weight-Bearing Status   Weight-Bearing Status (Left): full weight bearing   Weight-Bearing Status (Right): WBAT in CAM boot   Assistive device used: wheelchair and SPC     Ambulation: Level Surfaces   Ambulation with assistive device: independent with cane and CAM boot  Ambulation without assistive device: unable     Ambulation: Stairs   Ascend stairs: unable  Descend stairs: unable      Assessment/Plan   Pt is a 73 y/o female who has been attending PT s/p R ankle ORIF. Pt has shown good progress in her overall active and passive range of motion since " initial evaluation. In addition, her ankle strength has improved. She continues to need improvement in this area, in particular with ankle inversion strength to improve stability in ankle. She has been compliant with HEP and wearing her CAM boot. She ambulates in the clinic in parallel bars with CAM boot and SPC and does well with step through gait pattern. Pt requires additional skilled PT in order to continue to improve strength of ankle, increase functional mobility, improve gait tasks, and return to PLOF.  Progress toward previous goals: Partially Met    Plan Goals: STG 2-4 weeks    1. LEFS score to increase by 10 points in order to show improvement in overall functional activities. MET  2. Pt will be able to improve ankle DF ROM to 12 degrees in order to progress mobility for ambulation in loading PROGRESSING  3. Pt will be independent in home exercise program MET    LTG 4-6 weeks    1. Pt will have normal R ankle PROM in order to improve functional mobility PROGRESSING  2. Pt will be able to ambulate with FWW 25 feet with good form and stability with step through gait pattern NOT MET  3. Pt will be able to perform 5TSTS using hands in <30 seconds PROGRESSING      Recommendations: Continue as planned  Timeframe: 3 months  Prognosis to achieve goals: good    PT Signature: Valery Avila, PT      Based upon review of the patient's progress and continued therapy plan, it is my medical opinion that Tiff See should continue physical therapy treatment at [facility]  [address] [place] [id number] [phone].        Manual Therapy:    15     mins  55590;  Therapeutic Exercise:    25     mins  30222;     Neuromuscular Robb:    0    mins  59300;    Therapeutic Activity:     0     mins  20528;     Gait Trainin     mins  96487;     Ultrasound:     0     mins  36070;    Electrical Stimulation:    0     mins  25691 ( );  Dry Needling      0     mins self-pay    Timed Treatment:   40   mins   Total Treatment:     40   mins

## 2022-09-08 ENCOUNTER — TREATMENT (OUTPATIENT)
Dept: PHYSICAL THERAPY | Facility: CLINIC | Age: 72
End: 2022-09-08

## 2022-09-08 DIAGNOSIS — M25.571 ACUTE RIGHT ANKLE PAIN: ICD-10-CM

## 2022-09-08 DIAGNOSIS — R26.9 GAIT DISTURBANCE: ICD-10-CM

## 2022-09-08 DIAGNOSIS — S93.431A ANKLE SYNDESMOSIS DISRUPTION, RIGHT, INITIAL ENCOUNTER: Primary | ICD-10-CM

## 2022-09-08 DIAGNOSIS — Z87.81 STATUS POST OPEN REDUCTION WITH INTERNAL FIXATION (ORIF) OF FRACTURE OF ANKLE: ICD-10-CM

## 2022-09-08 DIAGNOSIS — Z98.890 STATUS POST OPEN REDUCTION WITH INTERNAL FIXATION (ORIF) OF FRACTURE OF ANKLE: ICD-10-CM

## 2022-09-08 PROCEDURE — 97530 THERAPEUTIC ACTIVITIES: CPT | Performed by: PHYSICAL THERAPIST

## 2022-09-08 PROCEDURE — 97110 THERAPEUTIC EXERCISES: CPT | Performed by: PHYSICAL THERAPIST

## 2022-09-08 PROCEDURE — 97140 MANUAL THERAPY 1/> REGIONS: CPT | Performed by: PHYSICAL THERAPIST

## 2022-09-08 NOTE — PROGRESS NOTES
Physical Therapy Daily Progress Note        Patient: Tiff See   : 1950  Diagnosis/ICD-10 Code:  Ankle syndesmosis disruption, right, initial encounter [S93.431A]  Referring practitioner: Spenser Stevens MD  Date of Initial Visit: Type: THERAPY  Noted: 2022  Today's Date: 2022  Patient seen for 10 sessions         Tiff See reports:       Subjective   Pt states that her doctor's appt went well and she can now be full weight bearing without her boot  Objective   See Exercise, Manual, and Modality Logs for complete treatment.       Assessment/Plan  Pt tolerated treatment well and is now FWB during ambulation using SPC for safety and stability. Pt required verbal cueing for proper step through pattern on level surfaces, was able to correct form with cue. Pt instructed on how to perform standing lunge stretch on stair step using UE to improve WB and flexibility on RLE. Pt shows good understanding. Continue to progress towards goals, to normalize gait, and return to PLOF.  Progress per Plan of Care           Manual Therapy:    15     mins  04385;  Therapeutic Exercise:    15     mins  71705;     Neuromuscular Robb:    0    mins  49954;    Therapeutic Activity:     15     mins  20239;     Gait Trainin     mins  71464;     Ultrasound:     0     mins  93810;    Electrical Stimulation:    0     mins  36059 ( );  Dry Needling     0     mins self-pay    Timed Treatment:   45   mins   Total Treatment:     45   mins    Valery Avila PT  Physical Therapist

## 2022-09-15 ENCOUNTER — TREATMENT (OUTPATIENT)
Dept: PHYSICAL THERAPY | Facility: CLINIC | Age: 72
End: 2022-09-15

## 2022-09-15 DIAGNOSIS — Z87.81 STATUS POST OPEN REDUCTION WITH INTERNAL FIXATION (ORIF) OF FRACTURE OF ANKLE: ICD-10-CM

## 2022-09-15 DIAGNOSIS — R26.9 GAIT DISTURBANCE: ICD-10-CM

## 2022-09-15 DIAGNOSIS — S93.431A ANKLE SYNDESMOSIS DISRUPTION, RIGHT, INITIAL ENCOUNTER: Primary | ICD-10-CM

## 2022-09-15 DIAGNOSIS — M25.571 ACUTE RIGHT ANKLE PAIN: ICD-10-CM

## 2022-09-15 DIAGNOSIS — Z98.890 STATUS POST OPEN REDUCTION WITH INTERNAL FIXATION (ORIF) OF FRACTURE OF ANKLE: ICD-10-CM

## 2022-09-15 PROCEDURE — 97110 THERAPEUTIC EXERCISES: CPT | Performed by: PHYSICAL THERAPIST

## 2022-09-15 PROCEDURE — 97530 THERAPEUTIC ACTIVITIES: CPT | Performed by: PHYSICAL THERAPIST

## 2022-09-15 PROCEDURE — 97116 GAIT TRAINING THERAPY: CPT | Performed by: PHYSICAL THERAPIST

## 2022-09-15 PROCEDURE — 97140 MANUAL THERAPY 1/> REGIONS: CPT | Performed by: PHYSICAL THERAPIST

## 2022-09-15 NOTE — PROGRESS NOTES
"   Physical Therapy Daily Progress Note        Patient: Tiff See   : 1950  Diagnosis/ICD-10 Code:  Ankle syndesmosis disruption, right, initial encounter [S93.431A]  Referring practitioner: Spenser Stevens MD  Date of Initial Visit: Type: THERAPY  Noted: 2022  Today's Date: 9/15/2022  Patient seen for 11 sessions         Tiff See reports:       Subjective   \"I can tell it is getting more pliable when I walk\"  Objective   See Exercise, Manual, and Modality Logs for complete treatment.       Assessment/Plan  Pt demonstrated decreased step length when ambulating in parallel bars and benefited from verbal cueing to encourage larger steps and upright posture when stepping through stance phase of gait on RLE. Pt showed good response to cue and able to perform pain free and with good stability. Continue to progress towards goals to increase independence and to normalize gait.   Progress per Plan of Care           Manual Therapy:    15     mins  12631;  Therapeutic Exercise:    20     mins  81881;     Neuromuscular Robb:    0    mins  21343;    Therapeutic Activity:     10     mins  95948;     Gait Trainin     mins  81829;     Ultrasound:     0     mins  95851;    Electrical Stimulation:    0     mins  92614 (MC );  Dry Needling     0     mins self-pay    Timed Treatment:   53   mins   Total Treatment:     53   mins    Valery Avila PT  Physical Therapist  [place] Lic #[id number]                    "

## 2022-09-22 ENCOUNTER — TREATMENT (OUTPATIENT)
Dept: PHYSICAL THERAPY | Facility: CLINIC | Age: 72
End: 2022-09-22

## 2022-09-22 DIAGNOSIS — R26.9 GAIT DISTURBANCE: ICD-10-CM

## 2022-09-22 DIAGNOSIS — S93.431A ANKLE SYNDESMOSIS DISRUPTION, RIGHT, INITIAL ENCOUNTER: Primary | ICD-10-CM

## 2022-09-22 DIAGNOSIS — Z98.890 STATUS POST OPEN REDUCTION WITH INTERNAL FIXATION (ORIF) OF FRACTURE OF ANKLE: ICD-10-CM

## 2022-09-22 DIAGNOSIS — M25.571 ACUTE RIGHT ANKLE PAIN: ICD-10-CM

## 2022-09-22 DIAGNOSIS — Z87.81 STATUS POST OPEN REDUCTION WITH INTERNAL FIXATION (ORIF) OF FRACTURE OF ANKLE: ICD-10-CM

## 2022-09-22 PROCEDURE — 97110 THERAPEUTIC EXERCISES: CPT | Performed by: PHYSICAL THERAPIST

## 2022-09-22 NOTE — PROGRESS NOTES
Physical Therapy Daily Progress Note        Patient: Tiff See   : 1950  Diagnosis/ICD-10 Code:  Ankle syndesmosis disruption, right, initial encounter [S93.431A]  Referring practitioner: Spenser Stevens MD  Date of Initial Visit: Type: THERAPY  Noted: 2022  Today's Date: 2022  Patient seen for 12 sessions         Tiff See reports:       Subjective   Pt reports that she feels like she is getting around very well and would like to self discharge today with home exercises.   Objective   See Exercise, Manual, and Modality Logs for complete treatment.       Assessment/Plan  Pt shows good understanding of comprehensive home exercise program. Pt has been attending PT for 12 sessions s/p R ankle ORIF. Pt has improved in strength and mobility since initial evaluation and can now ambulate without an AD safely. Pt would cameron to self discharge today with home exercises. Pt has been compliant with PT and performing her exercises at home consistently. D/c prognosis is good.   Other pt self discharge today           Manual Therapy:    0     mins  30706;  Therapeutic Exercise:    8     mins  91036;     Neuromuscular Robb:    0    mins  90524;    Therapeutic Activity:     0     mins  71925;     Gait Trainin     mins  68519;     Ultrasound:     0     mins  38792;    Electrical Stimulation:    0     mins  20986 ( );  Dry Needling     0     mins self-pay    Timed Treatment:   8   mins   Total Treatment:     8   mins    Valery Avila PT   [place] Lic #[id number]  Physical Therapist

## 2022-11-23 ENCOUNTER — TRANSCRIBE ORDERS (OUTPATIENT)
Dept: ADMINISTRATIVE | Facility: HOSPITAL | Age: 72
End: 2022-11-23

## 2022-11-23 DIAGNOSIS — Z13.6 ENCOUNTER FOR SCREENING FOR VASCULAR DISEASE: Primary | ICD-10-CM

## 2022-12-05 ENCOUNTER — TRANSCRIBE ORDERS (OUTPATIENT)
Dept: ADMINISTRATIVE | Facility: HOSPITAL | Age: 72
End: 2022-12-05

## 2022-12-05 ENCOUNTER — LAB (OUTPATIENT)
Dept: LAB | Facility: HOSPITAL | Age: 72
End: 2022-12-05

## 2022-12-05 DIAGNOSIS — I10 HYPERTENSION, ESSENTIAL: ICD-10-CM

## 2022-12-05 DIAGNOSIS — R73.03 DIABETES MELLITUS, LATENT: ICD-10-CM

## 2022-12-05 DIAGNOSIS — R73.03 DIABETES MELLITUS, LATENT: Primary | ICD-10-CM

## 2022-12-05 LAB
ALBUMIN SERPL-MCNC: 4.1 G/DL (ref 3.5–5.2)
ALBUMIN UR-MCNC: 3 MG/DL
ALBUMIN/GLOB SERPL: 1.5 G/DL
ALP SERPL-CCNC: 91 U/L (ref 39–117)
ALT SERPL W P-5'-P-CCNC: 15 U/L (ref 1–33)
ANION GAP SERPL CALCULATED.3IONS-SCNC: 9.1 MMOL/L (ref 5–15)
AST SERPL-CCNC: 19 U/L (ref 1–32)
BACTERIA UR QL AUTO: ABNORMAL /HPF
BASOPHILS # BLD AUTO: 0.09 10*3/MM3 (ref 0–0.2)
BASOPHILS NFR BLD AUTO: 1.3 % (ref 0–1.5)
BILIRUB SERPL-MCNC: 0.5 MG/DL (ref 0–1.2)
BILIRUB UR QL STRIP: NEGATIVE
BUN SERPL-MCNC: 17 MG/DL (ref 8–23)
BUN/CREAT SERPL: 19.1 (ref 7–25)
CALCIUM SPEC-SCNC: 9.3 MG/DL (ref 8.6–10.5)
CHLORIDE SERPL-SCNC: 107 MMOL/L (ref 98–107)
CHOLEST SERPL-MCNC: 184 MG/DL (ref 0–200)
CLARITY UR: CLEAR
CO2 SERPL-SCNC: 24.9 MMOL/L (ref 22–29)
COLOR UR: YELLOW
CREAT SERPL-MCNC: 0.89 MG/DL (ref 0.57–1)
CREAT UR-MCNC: 33.7 MG/DL
DEPRECATED RDW RBC AUTO: 44.9 FL (ref 37–54)
EGFRCR SERPLBLD CKD-EPI 2021: 68.6 ML/MIN/1.73
EOSINOPHIL # BLD AUTO: 0.46 10*3/MM3 (ref 0–0.4)
EOSINOPHIL NFR BLD AUTO: 6.7 % (ref 0.3–6.2)
ERYTHROCYTE [DISTWIDTH] IN BLOOD BY AUTOMATED COUNT: 13.5 % (ref 12.3–15.4)
GLOBULIN UR ELPH-MCNC: 2.8 GM/DL
GLUCOSE SERPL-MCNC: 111 MG/DL (ref 65–99)
GLUCOSE UR STRIP-MCNC: NEGATIVE MG/DL
HBA1C MFR BLD: 5.7 % (ref 4.8–5.6)
HCT VFR BLD AUTO: 41.2 % (ref 34–46.6)
HDLC SERPL QL: 2.75
HDLC SERPL-MCNC: 67 MG/DL (ref 40–60)
HGB BLD-MCNC: 13.1 G/DL (ref 12–15.9)
HGB UR QL STRIP.AUTO: ABNORMAL
HYALINE CASTS UR QL AUTO: ABNORMAL /LPF
IMM GRANULOCYTES # BLD AUTO: 0.02 10*3/MM3 (ref 0–0.05)
IMM GRANULOCYTES NFR BLD AUTO: 0.3 % (ref 0–0.5)
KETONES UR QL STRIP: NEGATIVE
LDLC SERPL CALC-MCNC: 102 MG/DL (ref 0–100)
LEUKOCYTE ESTERASE UR QL STRIP.AUTO: ABNORMAL
LYMPHOCYTES # BLD AUTO: 2.19 10*3/MM3 (ref 0.7–3.1)
LYMPHOCYTES NFR BLD AUTO: 31.7 % (ref 19.6–45.3)
MCH RBC QN AUTO: 28.9 PG (ref 26.6–33)
MCHC RBC AUTO-ENTMCNC: 31.8 G/DL (ref 31.5–35.7)
MCV RBC AUTO: 90.7 FL (ref 79–97)
MICROALBUMIN/CREAT UR: 89 MG/G
MONOCYTES # BLD AUTO: 0.59 10*3/MM3 (ref 0.1–0.9)
MONOCYTES NFR BLD AUTO: 8.5 % (ref 5–12)
NEUTROPHILS NFR BLD AUTO: 3.56 10*3/MM3 (ref 1.7–7)
NEUTROPHILS NFR BLD AUTO: 51.5 % (ref 42.7–76)
NITRITE UR QL STRIP: NEGATIVE
NRBC BLD AUTO-RTO: 0 /100 WBC (ref 0–0.2)
PH UR STRIP.AUTO: 6 [PH] (ref 5–8)
PLATELET # BLD AUTO: 272 10*3/MM3 (ref 140–450)
PMV BLD AUTO: 10.3 FL (ref 6–12)
POTASSIUM SERPL-SCNC: 4.2 MMOL/L (ref 3.5–5.2)
PROT SERPL-MCNC: 6.9 G/DL (ref 6–8.5)
PROT UR QL STRIP: NEGATIVE
RBC # BLD AUTO: 4.54 10*6/MM3 (ref 3.77–5.28)
RBC # UR STRIP: ABNORMAL /HPF
REF LAB TEST METHOD: ABNORMAL
SODIUM SERPL-SCNC: 141 MMOL/L (ref 136–145)
SP GR UR STRIP: 1.01 (ref 1–1.03)
SQUAMOUS #/AREA URNS HPF: ABNORMAL /HPF
TRIGL SERPL-MCNC: 82 MG/DL (ref 0–150)
UROBILINOGEN UR QL STRIP: ABNORMAL
VLDLC SERPL-MCNC: 15 MG/DL (ref 5–40)
WBC # UR STRIP: ABNORMAL /HPF
WBC NRBC COR # BLD: 6.91 10*3/MM3 (ref 3.4–10.8)

## 2022-12-05 PROCEDURE — 80061 LIPID PANEL: CPT

## 2022-12-05 PROCEDURE — 85025 COMPLETE CBC W/AUTO DIFF WBC: CPT

## 2022-12-05 PROCEDURE — 80053 COMPREHEN METABOLIC PANEL: CPT

## 2022-12-05 PROCEDURE — 81001 URINALYSIS AUTO W/SCOPE: CPT

## 2022-12-05 PROCEDURE — 82043 UR ALBUMIN QUANTITATIVE: CPT

## 2022-12-05 PROCEDURE — 82570 ASSAY OF URINE CREATININE: CPT

## 2022-12-05 PROCEDURE — 36415 COLL VENOUS BLD VENIPUNCTURE: CPT

## 2022-12-05 PROCEDURE — 83036 HEMOGLOBIN GLYCOSYLATED A1C: CPT

## 2022-12-30 ENCOUNTER — HOSPITAL ENCOUNTER (OUTPATIENT)
Dept: CARDIOLOGY | Facility: HOSPITAL | Age: 72
Discharge: HOME | End: 2022-12-30
Admitting: INTERNAL MEDICINE

## 2022-12-30 VITALS
HEART RATE: 66 BPM | BODY MASS INDEX: 27.49 KG/M2 | DIASTOLIC BLOOD PRESSURE: 88 MMHG | HEIGHT: 64 IN | WEIGHT: 161 LBS | SYSTOLIC BLOOD PRESSURE: 188 MMHG

## 2022-12-30 DIAGNOSIS — Z13.6 ENCOUNTER FOR SCREENING FOR VASCULAR DISEASE: ICD-10-CM

## 2022-12-30 LAB
BH CV ECHO MEAS - DIST AO DIAM: 1.46 CM
BH CV VAS BP LEFT ARM: NORMAL MMHG
BH CV VAS BP RIGHT ARM: NORMAL MMHG
BH CV XLRA MEAS - MID AO DIAM: 1.68 CM
BH CV XLRA MEAS - PAD LEFT ABI DP: 1.19
BH CV XLRA MEAS - PAD LEFT ABI PT: 1.21
BH CV XLRA MEAS - PAD LEFT ARM: 188 MMHG
BH CV XLRA MEAS - PAD LEFT LEG DP: 224 MMHG
BH CV XLRA MEAS - PAD LEFT LEG PT: 227 MMHG
BH CV XLRA MEAS - PAD RIGHT ABI DP: 1.22
BH CV XLRA MEAS - PAD RIGHT ABI PT: 1.23
BH CV XLRA MEAS - PAD RIGHT ARM: 179 MMHG
BH CV XLRA MEAS - PAD RIGHT LEG DP: 229 MMHG
BH CV XLRA MEAS - PAD RIGHT LEG PT: 232 MMHG
BH CV XLRA MEAS - PROX AO DIAM: 2.08 CM
BH CV XLRA MEAS LEFT ICA/CCA RATIO: 0.95
BH CV XLRA MEAS LEFT MID CCA PSV: NORMAL CM/SEC
BH CV XLRA MEAS LEFT MID ICA PSV: NORMAL CM/SEC
BH CV XLRA MEAS LEFT PROX ECA PSV: NORMAL CM/SEC
BH CV XLRA MEAS RIGHT ICA/CCA RATIO: 0.67
BH CV XLRA MEAS RIGHT MID CCA PSV: NORMAL CM/SEC
BH CV XLRA MEAS RIGHT MID ICA PSV: NORMAL CM/SEC
BH CV XLRA MEAS RIGHT PROX ECA PSV: NORMAL CM/SEC
MAXIMAL PREDICTED HEART RATE: 147 BPM
STRESS TARGET HR: 125 BPM

## 2022-12-30 PROCEDURE — 93799 UNLISTED CV SVC/PROCEDURE: CPT

## 2023-01-27 ENCOUNTER — OFFICE VISIT (OUTPATIENT)
Dept: CARDIOLOGY | Facility: CLINIC | Age: 73
End: 2023-01-27
Payer: MEDICARE

## 2023-01-27 VITALS
RESPIRATION RATE: 18 BRPM | HEART RATE: 62 BPM | HEIGHT: 64 IN | SYSTOLIC BLOOD PRESSURE: 152 MMHG | OXYGEN SATURATION: 99 % | WEIGHT: 166 LBS | BODY MASS INDEX: 28.34 KG/M2 | DIASTOLIC BLOOD PRESSURE: 84 MMHG

## 2023-01-27 DIAGNOSIS — I65.21 CAROTID STENOSIS, ASYMPTOMATIC, RIGHT: ICD-10-CM

## 2023-01-27 DIAGNOSIS — I65.22 CAROTID STENOSIS, ASYMPTOMATIC, LEFT: ICD-10-CM

## 2023-01-27 DIAGNOSIS — E78.5 HYPERLIPIDEMIA, UNSPECIFIED HYPERLIPIDEMIA TYPE: Primary | ICD-10-CM

## 2023-01-27 PROCEDURE — 99204 OFFICE O/P NEW MOD 45 MIN: CPT | Performed by: INTERNAL MEDICINE

## 2023-01-27 PROCEDURE — 93000 ELECTROCARDIOGRAM COMPLETE: CPT | Performed by: INTERNAL MEDICINE

## 2023-01-27 RX ORDER — NEBIVOLOL 20 MG/1
20 TABLET ORAL DAILY
Qty: 180 TABLET | Refills: 3 | Status: SHIPPED | OUTPATIENT
Start: 2023-01-27 | End: 2023-01-27 | Stop reason: SDUPTHER

## 2023-01-27 RX ORDER — AMLODIPINE BESYLATE 5 MG/1
5 TABLET ORAL DAILY
Qty: 90 TABLET | Refills: 3 | Status: SHIPPED | OUTPATIENT
Start: 2023-01-27 | End: 2023-03-09

## 2023-01-27 NOTE — TELEPHONE ENCOUNTER
Caller: Tiff See    Relationship: Self    Best call back number: [phone]    Requested Prescriptions:   Requested Prescriptions     Pending Prescriptions Disp Refills   • nebivolol (BYSTOLIC) 20 MG tablet 180 tablet 3     Sig: Take 1 tablet by mouth Daily.        Pharmacy where request should be sent: [facility] - [address] [phone] [facility] [phone] [facility]       Does the patient have less than a 3 day supply:  [] Yes  [x] No    Would you like a call back once the refill request has been completed: [x] Yes [] No    If the office needs to give you a call back, can they leave a voicemail: [x] Yes [] No    Jordan Garcia Rep   01/27/23 15:01 EST

## 2023-01-27 NOTE — PROGRESS NOTES
"      CARDIOLOGY    Sepideh Arnold MD    ENCOUNTER DATE:  01/27/2023    Tiff See / 72 y.o. / female        CHIEF COMPLAINT / REASON FOR OFFICE VISIT     Heart Problem (New patient/Hypertension - Wanting to establish care )      HISTORY OF PRESENT ILLNESS       HPI    Tiff See is a 72 y.o. female     This is a lady who is referred here for hypertension. She is on valsartan 320 mg a day and Bystolic 40 mg a day but her blood pressure remains elevated at home. She has anxiety. She denies chest pain, shortness of breath, and palpitations. She has some swelling around her ankle which she broke in the spring of 2022.         REVIEW OF SYSTEMS     Review of Systems   Constitutional: Negative for chills, fever, weight gain and weight loss.   Cardiovascular: Negative for leg swelling.   Respiratory: Negative for cough, snoring and wheezing.    Hematologic/Lymphatic: Negative for bleeding problem. Does not bruise/bleed easily.   Skin: Negative for color change.   Musculoskeletal: Negative for falls, joint pain and myalgias.   Gastrointestinal: Negative for melena.   Genitourinary: Negative for hematuria.   Neurological: Negative for excessive daytime sleepiness.   Psychiatric/Behavioral: Negative for depression. The patient is not nervous/anxious.          VITAL SIGNS     Visit Vitals  /84 (BP Location: Left arm, Patient Position: Sitting, Cuff Size: Adult)   Pulse 62   Resp 18   Ht 162.6 cm (64\")   Wt 75.3 kg (166 lb)   SpO2 99%   BMI 28.49 kg/m²         Wt Readings from Last 3 Encounters:   01/27/23 75.3 kg (166 lb)   12/30/22 73 kg (161 lb)   01/21/22 77.1 kg (170 lb)     Body mass index is 28.49 kg/m².      PHYSICAL EXAMINATION     Constitutional:       General: Not in acute distress.  Neck:      Vascular: No carotid bruit or JVD.   Pulmonary:      Effort: Pulmonary effort is normal.      Breath sounds: Normal breath sounds.   Cardiovascular:      Normal rate. Regular rhythm.      Murmurs: There is no " murmur.   Psychiatric:         Mood and Affect: Mood and affect normal.           REVIEWED DATA       ECG 12 Lead    Date/Time: 1/27/2023 9:05 AM  Performed by: Sepideh Arnold MD  Authorized by: Sepideh Arnold MD   Previous ECG: no previous ECG available  Rhythm: sinus rhythm  BPM: 62  Conduction: conduction normal  ST Segments: ST segments normal  T Waves: T waves normal    Clinical impression: normal ECG              Lipid Panel    Lipid Panel 12/5/22   Total Cholesterol 184   Triglycerides 82   HDL Cholesterol 67 (A)   VLDL Cholesterol 15   LDL Cholesterol  102 (A)   (A) Abnormal value              Lab Results   Component Value Date    GLUCOSE 111 (H) 12/05/2022    BUN 17 12/05/2022    CREATININE 0.89 12/05/2022    BCR 19.1 12/05/2022    K 4.2 12/05/2022    CO2 24.9 12/05/2022    CALCIUM 9.3 12/05/2022    ALBUMIN 4.10 12/05/2022    AST 19 12/05/2022    ALT 15 12/05/2022       ASSESSMENT & PLAN      Diagnosis Plan   1. Hyperlipidemia, unspecified hyperlipidemia type        2. Carotid stenosis, asymptomatic, left        3. Carotid stenosis, asymptomatic, right          Hypertension. Her blood pressure is not controlled on valsartan 320 mg a day and Bystolic 40 mg a day. I am going to add amlodipine 5 mg a day. She is going to monitor her blood pressure at home. I asked her to bring her blood pressure cuff and numbers in when she follows up with the nurse practitioner in a few weeks, we will check it for accuracy.  Mild bilateral carotid artery plaque. I reviewed her lipid panel and her lipids looked pretty good. I do not think she needs to be on statin at this point in time.  Former cigarette smoker.    Follow-up with a nurse practitioner in 3 weeks.         Orders Placed This Encounter   Procedures   • ECG 12 Lead     This order was created via procedure documentation     Order Specific Question:   Release to patient     Answer:   Routine Release           MEDICATIONS         Discharge Medications           Accurate as of January 27, 2023  9:06 AM. If you have any questions, ask your nurse or doctor.            New Medications      Instructions Start Date   amLODIPine 5 MG tablet  Commonly known as: NORVASC  Started by: Sepideh Arnold MD   5 mg, Oral, Daily      nebivolol 20 MG tablet  Commonly known as: BYSTOLIC  Started by: Sepideh Arnold MD   20 mg, Oral, Daily         Continue These Medications      Instructions Start Date   cholecalciferol 25 MCG (1000 UT) tablet  Commonly known as: VITAMIN D3   1,000 Units, Oral, Daily      citalopram 20 MG tablet  Commonly known as: CeleXA   Oral      fluticasone 50 MCG/ACT nasal spray  Commonly known as: FLONASE   No dose, route, or frequency recorded.      levocetirizine 5 MG tablet  Commonly known as: XYZAL   5 mg, Oral, Every Evening      meloxicam 15 MG tablet  Commonly known as: MOBIC   15 mg, Oral, Daily      oxybutynin 5 MG tablet  Commonly known as: DITROPAN   No dose, route, or frequency recorded.      pantoprazole 40 MG EC tablet  Commonly known as: PROTONIX   No dose, route, or frequency recorded.      QC Daily Multivitamins/Iron tablet   Oral      valACYclovir 1000 MG tablet  Commonly known as: VALTREX   Oral      valsartan 320 MG tablet  Commonly known as: DIOVAN   320 mg, Oral, Daily         Stop These Medications    losartan 100 MG tablet  Commonly known as: COZAAR  Stopped by: MD Sepideh Jackson MD  01/27/23  09:06 EST    Part of this note may be an electronic transcription/translation of spoken language to printed text using the Dragon dictation system.

## 2023-01-30 RX ORDER — NEBIVOLOL 20 MG/1
20 TABLET ORAL DAILY
Qty: 180 TABLET | Refills: 3 | Status: SHIPPED | OUTPATIENT
Start: 2023-01-30 | End: 2023-02-06

## 2023-01-30 NOTE — TELEPHONE ENCOUNTER
Rx Refill Note  Requested Prescriptions     Pending Prescriptions Disp Refills    nebivolol (BYSTOLIC) 20 MG tablet 180 tablet 3     Sig: Take 1 tablet by mouth Daily.      Last office visit with prescribing clinician: 1/27/2023   Last telemedicine visit with prescribing clinician: 3/9/2023   Next office visit with prescribing clinician: Visit date not found                         Would you like a call back once the refill request has been completed: [] Yes [] No    If the office needs to give you a call back, can they leave a voicemail: [] Yes [] No    Suad Morel MA  01/30/23, 09:21 EST

## 2023-02-06 RX ORDER — NEBIVOLOL 20 MG/1
TABLET ORAL
Qty: 190 TABLET | Refills: 3 | Status: SHIPPED | OUTPATIENT
Start: 2023-02-06 | End: 2023-02-10

## 2023-02-10 RX ORDER — NEBIVOLOL 20 MG/1
40 TABLET ORAL DAILY
Qty: 180 TABLET | Refills: 3 | Status: SHIPPED | OUTPATIENT
Start: 2023-02-10

## 2023-02-10 NOTE — TELEPHONE ENCOUNTER
Patient called and was having trouble getting [facility] to fill her nebivolol.  She said [facility] told her it was too soon for a refill.  She asked if I would call them for her.    I called and spoke with a very nice person Ivana at [facility].  Originally we sent in nebivolol 20 mg 1 tablet daily, which is not her dose.  She is on nebivolol 40 mg daily (confirmed this by JL note on 1/27).  Looks like we sent in a corrected order with the correct dose on 2/6 but that one did not have a quantity so they could not fill it.    I have cancelled both of those orders a resent the order in for bystolic 40 mg daily.  Patient declined a 10 day supply be sent to a local pharmacy until [facility] can get the medication to her for 5-7 days.     I have asked her to call [facility] on Monday to ensure everything is good to go.  She will do that an call triage if there are any issues.    eYe Leslie RN  [facility] Triage  02/10/23 09:25 EST

## 2023-03-09 ENCOUNTER — OFFICE VISIT (OUTPATIENT)
Dept: CARDIOLOGY | Facility: CLINIC | Age: 73
End: 2023-03-09
Payer: MEDICARE

## 2023-03-09 VITALS
OXYGEN SATURATION: 95 % | BODY MASS INDEX: 29.19 KG/M2 | HEART RATE: 67 BPM | WEIGHT: 171 LBS | DIASTOLIC BLOOD PRESSURE: 94 MMHG | SYSTOLIC BLOOD PRESSURE: 144 MMHG | HEIGHT: 64 IN

## 2023-03-09 DIAGNOSIS — I10 PRIMARY HYPERTENSION: Primary | ICD-10-CM

## 2023-03-09 PROCEDURE — 99214 OFFICE O/P EST MOD 30 MIN: CPT | Performed by: NURSE PRACTITIONER

## 2023-03-09 PROCEDURE — 1159F MED LIST DOCD IN RCRD: CPT | Performed by: NURSE PRACTITIONER

## 2023-03-09 PROCEDURE — 1160F RVW MEDS BY RX/DR IN RCRD: CPT | Performed by: NURSE PRACTITIONER

## 2023-03-09 RX ORDER — LEVOFLOXACIN 500 MG/1
TABLET, FILM COATED ORAL
COMMUNITY
Start: 2023-03-07

## 2023-03-09 RX ORDER — NITROFURANTOIN 25; 75 MG/1; MG/1
CAPSULE ORAL
COMMUNITY
Start: 2023-03-07

## 2023-03-09 RX ORDER — IVERMECTIN 10 MG/G
CREAM TOPICAL
COMMUNITY
Start: 2022-12-16

## 2023-03-09 RX ORDER — HYDROCHLOROTHIAZIDE 25 MG/1
25 TABLET ORAL DAILY
Qty: 30 TABLET | Refills: 5 | Status: SHIPPED | OUTPATIENT
Start: 2023-03-09

## 2023-03-09 NOTE — PROGRESS NOTES
"Date of Office Visit: 23  Encounter Provider: LULA Young  Place of Service: [facility] CARDIOLOGY  Patient Name: Tiff See  :1950    Chief Complaint   Patient presents with   • Follow-up   • Hyperlipidemia   • Carotid Stenosis   :     HPI: Tiff See is a 73 y.o. female  with hyperlipidemia, hypertension, anxiety/depression, carotid artery stenosis    She is followed by Dr. Arnold.  I will visit with her for the first time have reviewed her medical record.    In 2022 she had a vascular screening showing mild bilateral carotid artery plaque without stenosis, abdominal aorta measuring normally with normal lower extremity arterial pressures bilateral.  She then was sent to our office for uncontrolled hypertension despite valsartan 320 mg daily and nebivolol 40 mg daily.  Amlodipine 5 mg was added to her regimen.    She presents today for follow-up.  She tells me she has had more issues with flushing and rosacea on her face.  This seems worse since being on amlodipine.  She denies chest pain or shortness of breath.  She does not add salt to her food but does not pay close attention to the sodium on food labels.  She has no edema.  She does elliptical 30 minutes at a time without chest pain.  She has no headaches or blurry vision or symptoms of stroke.  She is scheduled for urologic procedure on the  [facility] for kidney stone and stent placement..  Allergies   Allergen Reactions   • Amlodipine Other (See Comments)     Facial flushing             Family and social history reviewed.     ROS  All other systems were reviewed and are negative          Objective:     Vitals:    23 1520   BP: 144/94   BP Location: Right arm   Patient Position: Sitting   Cuff Size: Adult   Pulse: 67   SpO2: 95%   Weight: 77.6 kg (171 lb)   Height: 162.6 cm (64\")     Body mass index is 29.35 kg/m².    PHYSICAL EXAM:  Pulmonary:      Effort: Pulmonary effort is " normal.   Cardiovascular:      Normal rate.         Procedures      Current Outpatient Medications   Medication Sig Dispense Refill   • Cholecalciferol (VITAMIN D) 1000 units tablet Take 1 tablet by mouth Daily.     • citalopram (CeleXA) 20 MG tablet Take  by mouth.     • fluticasone (FLONASE) 50 MCG/ACT nasal spray      • Ivermectin 1 % cream      • levocetirizine (XYZAL) 5 MG tablet Take 1 tablet by mouth Every Evening.     • levoFLOXacin (LEVAQUIN) 500 MG tablet      • meloxicam (MOBIC) 15 MG tablet Take 1 tablet by mouth Daily.     • Multiple Vitamins-Iron (QC DAILY MULTIVITAMINS/IRON) tablet Take  by mouth.     • nebivolol (BYSTOLIC) 20 MG tablet Take 2 tablets by mouth Daily. 180 tablet 3   • nitrofurantoin, macrocrystal-monohydrate, (MACROBID) 100 MG capsule      • oxybutynin (DITROPAN) 5 MG tablet      • pantoprazole (PROTONIX) 40 MG EC tablet      • valACYclovir (VALTREX) 1000 MG tablet Take  by mouth.     • valsartan (DIOVAN) 320 MG tablet Take 1 tablet by mouth Daily.     • hydroCHLOROthiazide (HYDRODIURIL) 25 MG tablet Take 1 tablet by mouth Daily. 30 tablet 5     No current facility-administered medications for this visit.     Assessment:       Diagnosis Plan   1. Primary hypertension  Basic Metabolic Panel           Orders Placed This Encounter   Procedures   • Basic Metabolic Panel     Standing Status:   Future     Standing Expiration Date:   3/9/2024     Order Specific Question:   Release to patient     Answer:   Routine Release         Plan:       1.  73-year-old female with uncontrolled hypertension despite valsartan 320 mg daily and nebivolol 40 mg daily.  She did not tolerate amlodipine due to facial flushing and worsening rosacea so we will stop that.  Reviewed her home blood pressure log which is actually labile.  She has readings anywhere from 153/83 to 181/88, 149/78 and 128/74.  She would like to avoid adding another agent if possible.  She would prefer to switch out one of her agents.  We  discussed weaning off nebivolol after her urologic procedure coming up on 3/20.  The plan will be that she continues valsartan and after her surgery she will start to wean off of nebivolol.  I will have her take 20 mg for 3 days and then 10 mg for 3 days and then stop it.  She will start hydrochlorothiazide and call me with any issues.  I will have her follow-up with me in about 6 weeks  2.  Anxiety on therapy  3.  Rosacea  4.  Kidney stone-following with [facility] urology.  She is scheduled for urologic procedure 3/20/2022.  She does not require an additional cardiovascular testing or examination prior to proceeding with that.  Plan for her blood pressure is above                It has been a pleasure to participate in this patient's care.      Thank you,  LULA Young      **I used Dragon to dictate this note:**

## 2023-03-10 ENCOUNTER — TRANSCRIBE ORDERS (OUTPATIENT)
Dept: ADMINISTRATIVE | Facility: HOSPITAL | Age: 73
End: 2023-03-10
Payer: MEDICARE

## 2023-03-10 ENCOUNTER — LAB (OUTPATIENT)
Dept: LAB | Facility: HOSPITAL | Age: 73
End: 2023-03-10
Payer: MEDICARE

## 2023-03-10 ENCOUNTER — TELEPHONE (OUTPATIENT)
Dept: CARDIOLOGY | Facility: CLINIC | Age: 73
End: 2023-03-10
Payer: MEDICARE

## 2023-03-10 DIAGNOSIS — Z01.818 PRE-OP TESTING: Primary | ICD-10-CM

## 2023-03-10 DIAGNOSIS — I10 PRIMARY HYPERTENSION: ICD-10-CM

## 2023-03-10 DIAGNOSIS — I10 PRIMARY HYPERTENSION: Primary | ICD-10-CM

## 2023-03-10 DIAGNOSIS — Z01.818 PRE-OP TESTING: ICD-10-CM

## 2023-03-10 DIAGNOSIS — Z13.6 ENCOUNTER FOR SCREENING FOR VASCULAR DISEASE: ICD-10-CM

## 2023-03-10 LAB
ABO GROUP BLD: NORMAL
ANION GAP SERPL CALCULATED.3IONS-SCNC: 8 MMOL/L (ref 5–15)
BASOPHILS # BLD AUTO: 0.08 10*3/MM3 (ref 0–0.2)
BASOPHILS NFR BLD AUTO: 1 % (ref 0–1.5)
BLD GP AB SCN SERPL QL: NEGATIVE
BUN SERPL-MCNC: 15 MG/DL (ref 8–23)
BUN/CREAT SERPL: 16.7 (ref 7–25)
CALCIUM SPEC-SCNC: 9 MG/DL (ref 8.6–10.5)
CHLORIDE SERPL-SCNC: 104 MMOL/L (ref 98–107)
CO2 SERPL-SCNC: 27 MMOL/L (ref 22–29)
CREAT SERPL-MCNC: 0.9 MG/DL (ref 0.57–1)
DEPRECATED RDW RBC AUTO: 41.7 FL (ref 37–54)
EGFRCR SERPLBLD CKD-EPI 2021: 67.2 ML/MIN/1.73
EOSINOPHIL # BLD AUTO: 0.35 10*3/MM3 (ref 0–0.4)
EOSINOPHIL NFR BLD AUTO: 4.2 % (ref 0.3–6.2)
ERYTHROCYTE [DISTWIDTH] IN BLOOD BY AUTOMATED COUNT: 13.4 % (ref 12.3–15.4)
GLUCOSE SERPL-MCNC: 88 MG/DL (ref 65–99)
HCT VFR BLD AUTO: 42.1 % (ref 34–46.6)
HGB BLD-MCNC: 14.3 G/DL (ref 12–15.9)
IMM GRANULOCYTES # BLD AUTO: 0.04 10*3/MM3 (ref 0–0.05)
IMM GRANULOCYTES NFR BLD AUTO: 0.5 % (ref 0–0.5)
LYMPHOCYTES # BLD AUTO: 2.32 10*3/MM3 (ref 0.7–3.1)
LYMPHOCYTES NFR BLD AUTO: 27.8 % (ref 19.6–45.3)
MCH RBC QN AUTO: 29.2 PG (ref 26.6–33)
MCHC RBC AUTO-ENTMCNC: 34 G/DL (ref 31.5–35.7)
MCV RBC AUTO: 86.1 FL (ref 79–97)
MONOCYTES # BLD AUTO: 0.8 10*3/MM3 (ref 0.1–0.9)
MONOCYTES NFR BLD AUTO: 9.6 % (ref 5–12)
NEUTROPHILS NFR BLD AUTO: 4.76 10*3/MM3 (ref 1.7–7)
NEUTROPHILS NFR BLD AUTO: 56.9 % (ref 42.7–76)
NRBC BLD AUTO-RTO: 0 /100 WBC (ref 0–0.2)
PLATELET # BLD AUTO: 291 10*3/MM3 (ref 140–450)
PMV BLD AUTO: 10.6 FL (ref 6–12)
POTASSIUM SERPL-SCNC: 4 MMOL/L (ref 3.5–5.2)
RBC # BLD AUTO: 4.89 10*6/MM3 (ref 3.77–5.28)
RH BLD: POSITIVE
SODIUM SERPL-SCNC: 139 MMOL/L (ref 136–145)
T&S EXPIRATION DATE: NORMAL
WBC NRBC COR # BLD: 8.35 10*3/MM3 (ref 3.4–10.8)

## 2023-03-10 PROCEDURE — 86900 BLOOD TYPING SEROLOGIC ABO: CPT

## 2023-03-10 PROCEDURE — 85025 COMPLETE CBC W/AUTO DIFF WBC: CPT

## 2023-03-10 PROCEDURE — 86901 BLOOD TYPING SEROLOGIC RH(D): CPT

## 2023-03-10 PROCEDURE — 36415 COLL VENOUS BLD VENIPUNCTURE: CPT

## 2023-03-10 PROCEDURE — 86850 RBC ANTIBODY SCREEN: CPT

## 2023-03-10 PROCEDURE — 80048 BASIC METABOLIC PNL TOTAL CA: CPT

## 2023-03-10 NOTE — TELEPHONE ENCOUNTER
Kidney function normal however, too soon for this lab for me. I wanted to check renal function after starting HCTZ. This was preop lab. She will need a repeat BMP at [place] which is expected 1-2 weeks after starting HCTZ. She is not going to start HCTZ until after her urologic procedure. New order placed.

## 2023-03-20 PROCEDURE — 82365 CALCULUS SPECTROSCOPY: CPT | Performed by: UROLOGY

## 2023-03-21 ENCOUNTER — LAB REQUISITION (OUTPATIENT)
Dept: LAB | Facility: HOSPITAL | Age: 73
End: 2023-03-21
Payer: MEDICARE

## 2023-03-21 DIAGNOSIS — N20.0 CALCULUS OF KIDNEY: ICD-10-CM

## 2023-03-28 LAB
CA CARBONATE CRY STONE QL IR: 20 %
COLOR STONE: NORMAL
COMPN STONE: NORMAL
LABORATORY COMMENT REPORT: NORMAL
LABORATORY COMMENT REPORT: NORMAL
Lab: NORMAL
Lab: NORMAL
PHOTO: NORMAL
SIZE STONE: NORMAL MM
SPEC SOURCE SUBJ: NORMAL
STONE ANALYSIS-IMP: NORMAL
TRI-PHOS MFR STONE: 80 %
WT STONE: 1056 MG

## 2023-03-31 ENCOUNTER — APPOINTMENT (OUTPATIENT)
Dept: CARDIOLOGY | Facility: HOSPITAL | Age: 73
End: 2023-03-31

## 2023-04-04 ENCOUNTER — PATIENT MESSAGE (OUTPATIENT)
Dept: CARDIOLOGY | Facility: CLINIC | Age: 73
End: 2023-04-04
Payer: MEDICARE

## 2023-04-04 RX ORDER — HYDRALAZINE HYDROCHLORIDE 25 MG/1
25 TABLET, FILM COATED ORAL 2 TIMES DAILY
Qty: 60 TABLET | Refills: 5 | Status: SHIPPED | OUTPATIENT
Start: 2023-04-04

## 2023-04-04 NOTE — TELEPHONE ENCOUNTER
From: Tiff See  To: Micki Reynoso  Sent: 4/4/2023 6:29 AM EDT  Subject: Hydralazine HCL    I’m willing to give this medication a try.   You can send the RX order to [facility]. Thank you.

## 2023-04-19 ENCOUNTER — PATIENT MESSAGE (OUTPATIENT)
Dept: CARDIOLOGY | Facility: CLINIC | Age: 73
End: 2023-04-19
Payer: MEDICARE

## 2023-04-19 NOTE — TELEPHONE ENCOUNTER
From: Tiff See  To: Micki Reynoso  Sent: 4/19/2023 8:45 AM EDT  Subject: Tiff See—hydralazine    Good morning!  I have been taking the hydralazine HCL 25 mg twice a day for three weeks now.   However, my blood pressure has actually gotten worse: started off on the day after I began taking the hydralazine at 159/96; continued to be be a bit higher each day; today it was 203/100.  What should I do?  Thank you!

## 2023-08-29 ENCOUNTER — APPOINTMENT (OUTPATIENT)
Dept: WOMENS IMAGING | Facility: HOSPITAL | Age: 73
End: 2023-08-29
Payer: MEDICARE

## 2023-08-29 PROCEDURE — 77067 SCR MAMMO BI INCL CAD: CPT | Performed by: RADIOLOGY

## 2023-08-29 PROCEDURE — 77063 BREAST TOMOSYNTHESIS BI: CPT | Performed by: RADIOLOGY

## 2023-08-30 ENCOUNTER — LAB (OUTPATIENT)
Dept: LAB | Facility: HOSPITAL | Age: 73
End: 2023-08-30
Payer: MEDICARE

## 2023-08-30 ENCOUNTER — HOSPITAL ENCOUNTER (OUTPATIENT)
Dept: CARDIOLOGY | Facility: HOSPITAL | Age: 73
Discharge: HOME | End: 2023-08-30
Payer: MEDICARE

## 2023-08-30 ENCOUNTER — TRANSCRIBE ORDERS (OUTPATIENT)
Dept: ADMINISTRATIVE | Facility: HOSPITAL | Age: 73
End: 2023-08-30
Payer: MEDICARE

## 2023-08-30 DIAGNOSIS — Z01.818 PRE-OP EVALUATION: ICD-10-CM

## 2023-08-30 DIAGNOSIS — Z01.812 PRE-OPERATIVE LABORATORY EXAMINATION: Primary | ICD-10-CM

## 2023-08-30 DIAGNOSIS — M17.11 ARTHRITIS OF RIGHT KNEE: ICD-10-CM

## 2023-08-30 DIAGNOSIS — Z01.812 PRE-OPERATIVE LABORATORY EXAMINATION: ICD-10-CM

## 2023-08-30 LAB
ANION GAP SERPL CALCULATED.3IONS-SCNC: 9 MMOL/L (ref 5–15)
BASOPHILS # BLD AUTO: 0.06 10*3/MM3 (ref 0–0.2)
BASOPHILS NFR BLD AUTO: 0.9 % (ref 0–1.5)
BUN SERPL-MCNC: 18 MG/DL (ref 8–23)
BUN/CREAT SERPL: 17.3 (ref 7–25)
CALCIUM SPEC-SCNC: 9.3 MG/DL (ref 8.6–10.5)
CHLORIDE SERPL-SCNC: 104 MMOL/L (ref 98–107)
CO2 SERPL-SCNC: 27 MMOL/L (ref 22–29)
CREAT SERPL-MCNC: 1.04 MG/DL (ref 0.57–1)
DEPRECATED RDW RBC AUTO: 43.8 FL (ref 37–54)
EGFRCR SERPLBLD CKD-EPI 2021: 56.5 ML/MIN/1.73
EOSINOPHIL # BLD AUTO: 0.46 10*3/MM3 (ref 0–0.4)
EOSINOPHIL NFR BLD AUTO: 6.8 % (ref 0.3–6.2)
ERYTHROCYTE [DISTWIDTH] IN BLOOD BY AUTOMATED COUNT: 13.3 % (ref 12.3–15.4)
GLUCOSE SERPL-MCNC: 117 MG/DL (ref 65–99)
HCT VFR BLD AUTO: 41 % (ref 34–46.6)
HGB BLD-MCNC: 13.7 G/DL (ref 12–15.9)
IMM GRANULOCYTES # BLD AUTO: 0.05 10*3/MM3 (ref 0–0.05)
IMM GRANULOCYTES NFR BLD AUTO: 0.7 % (ref 0–0.5)
LYMPHOCYTES # BLD AUTO: 2.2 10*3/MM3 (ref 0.7–3.1)
LYMPHOCYTES NFR BLD AUTO: 32.7 % (ref 19.6–45.3)
MCH RBC QN AUTO: 30.2 PG (ref 26.6–33)
MCHC RBC AUTO-ENTMCNC: 33.4 G/DL (ref 31.5–35.7)
MCV RBC AUTO: 90.5 FL (ref 79–97)
MONOCYTES # BLD AUTO: 0.82 10*3/MM3 (ref 0.1–0.9)
MONOCYTES NFR BLD AUTO: 12.2 % (ref 5–12)
NEUTROPHILS NFR BLD AUTO: 3.14 10*3/MM3 (ref 1.7–7)
NEUTROPHILS NFR BLD AUTO: 46.7 % (ref 42.7–76)
NRBC BLD AUTO-RTO: 0 /100 WBC (ref 0–0.2)
PLATELET # BLD AUTO: 245 10*3/MM3 (ref 140–450)
PMV BLD AUTO: 9.9 FL (ref 6–12)
POTASSIUM SERPL-SCNC: 4.7 MMOL/L (ref 3.5–5.2)
QT INTERVAL: 423 MS
QTC INTERVAL: 416 MS
RBC # BLD AUTO: 4.53 10*6/MM3 (ref 3.77–5.28)
SODIUM SERPL-SCNC: 140 MMOL/L (ref 136–145)
WBC NRBC COR # BLD: 6.73 10*3/MM3 (ref 3.4–10.8)

## 2023-08-30 PROCEDURE — 85025 COMPLETE CBC W/AUTO DIFF WBC: CPT

## 2023-08-30 PROCEDURE — 36415 COLL VENOUS BLD VENIPUNCTURE: CPT

## 2023-08-30 PROCEDURE — 93005 ELECTROCARDIOGRAM TRACING: CPT | Performed by: ORTHOPAEDIC SURGERY

## 2023-08-30 PROCEDURE — 80048 BASIC METABOLIC PNL TOTAL CA: CPT

## 2023-12-12 ENCOUNTER — LAB (OUTPATIENT)
Dept: LAB | Facility: HOSPITAL | Age: 73
End: 2023-12-12
Payer: MEDICARE

## 2023-12-12 ENCOUNTER — TRANSCRIBE ORDERS (OUTPATIENT)
Dept: ADMINISTRATIVE | Facility: HOSPITAL | Age: 73
End: 2023-12-12
Payer: MEDICARE

## 2023-12-12 DIAGNOSIS — F32.A DEPRESSIVE TYPE PSYCHOSIS: ICD-10-CM

## 2023-12-12 DIAGNOSIS — R73.03 DIABETES MELLITUS, LATENT: ICD-10-CM

## 2023-12-12 DIAGNOSIS — I10 ESSENTIAL HYPERTENSION, BENIGN: Primary | ICD-10-CM

## 2023-12-12 DIAGNOSIS — Z13.6 ENCOUNTER FOR SCREENING FOR VASCULAR DISEASE: ICD-10-CM

## 2023-12-12 DIAGNOSIS — I10 PRIMARY HYPERTENSION: ICD-10-CM

## 2023-12-12 DIAGNOSIS — I10 ESSENTIAL HYPERTENSION, BENIGN: ICD-10-CM

## 2023-12-12 LAB
ALBUMIN SERPL-MCNC: 4.3 G/DL (ref 3.5–5.2)
ALBUMIN UR-MCNC: <1.2 MG/DL
ALBUMIN/GLOB SERPL: 1.5 G/DL
ALP SERPL-CCNC: 76 U/L (ref 39–117)
ALT SERPL W P-5'-P-CCNC: 20 U/L (ref 1–33)
ANION GAP SERPL CALCULATED.3IONS-SCNC: 7.7 MMOL/L (ref 5–15)
AST SERPL-CCNC: 22 U/L (ref 1–32)
BACTERIA UR QL AUTO: ABNORMAL /HPF
BASOPHILS # BLD AUTO: 0.08 10*3/MM3 (ref 0–0.2)
BASOPHILS NFR BLD AUTO: 0.9 % (ref 0–1.5)
BILIRUB SERPL-MCNC: 0.5 MG/DL (ref 0–1.2)
BILIRUB UR QL STRIP: NEGATIVE
BUN SERPL-MCNC: 14 MG/DL (ref 8–23)
BUN/CREAT SERPL: 13.1 (ref 7–25)
CALCIUM SPEC-SCNC: 9.3 MG/DL (ref 8.6–10.5)
CHLORIDE SERPL-SCNC: 103 MMOL/L (ref 98–107)
CHOLEST SERPL-MCNC: 182 MG/DL (ref 0–200)
CLARITY UR: CLEAR
CO2 SERPL-SCNC: 28.3 MMOL/L (ref 22–29)
COLOR UR: YELLOW
CREAT SERPL-MCNC: 1.07 MG/DL (ref 0.57–1)
CREAT UR-MCNC: 102.4 MG/DL
DEPRECATED RDW RBC AUTO: 47.4 FL (ref 37–54)
EGFRCR SERPLBLD CKD-EPI 2021: 54.6 ML/MIN/1.73
EOSINOPHIL # BLD AUTO: 0.47 10*3/MM3 (ref 0–0.4)
EOSINOPHIL NFR BLD AUTO: 5.3 % (ref 0.3–6.2)
ERYTHROCYTE [DISTWIDTH] IN BLOOD BY AUTOMATED COUNT: 13.8 % (ref 12.3–15.4)
GLOBULIN UR ELPH-MCNC: 2.9 GM/DL
GLUCOSE SERPL-MCNC: 108 MG/DL (ref 65–99)
GLUCOSE UR STRIP-MCNC: NEGATIVE MG/DL
HBA1C MFR BLD: 5.7 % (ref 4.8–5.6)
HCT VFR BLD AUTO: 40.5 % (ref 34–46.6)
HDLC SERPL QL: 2.94
HDLC SERPL-MCNC: 62 MG/DL (ref 40–60)
HGB BLD-MCNC: 13 G/DL (ref 12–15.9)
HGB UR QL STRIP.AUTO: NEGATIVE
HYALINE CASTS UR QL AUTO: ABNORMAL /LPF
IMM GRANULOCYTES # BLD AUTO: 0.04 10*3/MM3 (ref 0–0.05)
IMM GRANULOCYTES NFR BLD AUTO: 0.5 % (ref 0–0.5)
KETONES UR QL STRIP: NEGATIVE
LDLC SERPL CALC-MCNC: 102 MG/DL (ref 0–100)
LEUKOCYTE ESTERASE UR QL STRIP.AUTO: ABNORMAL
LYMPHOCYTES # BLD AUTO: 2.34 10*3/MM3 (ref 0.7–3.1)
LYMPHOCYTES NFR BLD AUTO: 26.4 % (ref 19.6–45.3)
MCH RBC QN AUTO: 30.1 PG (ref 26.6–33)
MCHC RBC AUTO-ENTMCNC: 32.1 G/DL (ref 31.5–35.7)
MCV RBC AUTO: 93.8 FL (ref 79–97)
MICROALBUMIN/CREAT UR: NORMAL MG/G{CREAT}
MONOCYTES # BLD AUTO: 1.03 10*3/MM3 (ref 0.1–0.9)
MONOCYTES NFR BLD AUTO: 11.6 % (ref 5–12)
NEUTROPHILS NFR BLD AUTO: 4.89 10*3/MM3 (ref 1.7–7)
NEUTROPHILS NFR BLD AUTO: 55.3 % (ref 42.7–76)
NITRITE UR QL STRIP: NEGATIVE
NRBC BLD AUTO-RTO: 0 /100 WBC (ref 0–0.2)
PH UR STRIP.AUTO: 7 [PH] (ref 5–8)
PLATELET # BLD AUTO: 298 10*3/MM3 (ref 140–450)
PMV BLD AUTO: 9.8 FL (ref 6–12)
POTASSIUM SERPL-SCNC: 4.9 MMOL/L (ref 3.5–5.2)
PROT SERPL-MCNC: 7.2 G/DL (ref 6–8.5)
PROT UR QL STRIP: NEGATIVE
RBC # BLD AUTO: 4.32 10*6/MM3 (ref 3.77–5.28)
RBC # UR STRIP: ABNORMAL /HPF
REF LAB TEST METHOD: ABNORMAL
SODIUM SERPL-SCNC: 139 MMOL/L (ref 136–145)
SP GR UR STRIP: 1.02 (ref 1–1.03)
SQUAMOUS #/AREA URNS HPF: ABNORMAL /HPF
TRIGL SERPL-MCNC: 101 MG/DL (ref 0–150)
TSH SERPL DL<=0.05 MIU/L-ACNC: 3.8 UIU/ML (ref 0.27–4.2)
UROBILINOGEN UR QL STRIP: ABNORMAL
VLDLC SERPL-MCNC: 18 MG/DL (ref 5–40)
WBC # UR STRIP: ABNORMAL /HPF
WBC NRBC COR # BLD AUTO: 8.85 10*3/MM3 (ref 3.4–10.8)

## 2023-12-12 PROCEDURE — 85025 COMPLETE CBC W/AUTO DIFF WBC: CPT

## 2023-12-12 PROCEDURE — 36415 COLL VENOUS BLD VENIPUNCTURE: CPT

## 2023-12-12 PROCEDURE — 82043 UR ALBUMIN QUANTITATIVE: CPT

## 2023-12-12 PROCEDURE — 83036 HEMOGLOBIN GLYCOSYLATED A1C: CPT

## 2023-12-12 PROCEDURE — 82570 ASSAY OF URINE CREATININE: CPT

## 2023-12-12 PROCEDURE — 84443 ASSAY THYROID STIM HORMONE: CPT

## 2023-12-12 PROCEDURE — 80061 LIPID PANEL: CPT

## 2023-12-12 PROCEDURE — 80053 COMPREHEN METABOLIC PANEL: CPT

## 2023-12-12 PROCEDURE — 81001 URINALYSIS AUTO W/SCOPE: CPT

## 2023-12-18 ENCOUNTER — TRANSCRIBE ORDERS (OUTPATIENT)
Dept: ADMINISTRATIVE | Facility: HOSPITAL | Age: 73
End: 2023-12-18
Payer: MEDICARE

## 2023-12-18 DIAGNOSIS — Z87.891 PERSONAL HISTORY OF TOBACCO USE, PRESENTING HAZARDS TO HEALTH: Primary | ICD-10-CM

## 2024-01-11 ENCOUNTER — HOSPITAL ENCOUNTER (OUTPATIENT)
Dept: CT IMAGING | Facility: HOSPITAL | Age: 74
Discharge: HOME | End: 2024-01-11
Admitting: INTERNAL MEDICINE
Payer: MEDICARE

## 2024-01-11 DIAGNOSIS — Z87.891 PERSONAL HISTORY OF TOBACCO USE, PRESENTING HAZARDS TO HEALTH: ICD-10-CM

## 2024-01-11 PROCEDURE — 71271 CT THORAX LUNG CANCER SCR C-: CPT

## 2024-06-26 ENCOUNTER — OFFICE (OUTPATIENT)
Dept: URBAN - METROPOLITAN AREA CLINIC 76 | Facility: CLINIC | Age: 74
End: 2024-06-26

## 2024-06-26 VITALS
WEIGHT: 186 LBS | DIASTOLIC BLOOD PRESSURE: 70 MMHG | HEIGHT: 64 IN | SYSTOLIC BLOOD PRESSURE: 124 MMHG | HEART RATE: 77 BPM

## 2024-06-26 DIAGNOSIS — K58.0 IRRITABLE BOWEL SYNDROME WITH DIARRHEA: ICD-10-CM

## 2024-06-26 PROCEDURE — 99203 OFFICE O/P NEW LOW 30 MIN: CPT | Performed by: INTERNAL MEDICINE

## 2024-09-11 ENCOUNTER — APPOINTMENT (OUTPATIENT)
Dept: WOMENS IMAGING | Facility: HOSPITAL | Age: 74
End: 2024-09-11
Payer: MEDICARE

## 2024-09-11 PROCEDURE — 77067 SCR MAMMO BI INCL CAD: CPT | Performed by: RADIOLOGY

## 2024-09-11 PROCEDURE — 77063 BREAST TOMOSYNTHESIS BI: CPT | Performed by: RADIOLOGY

## 2024-11-05 ENCOUNTER — AMBULATORY SURGICAL CENTER (OUTPATIENT)
Age: 74
End: 2024-11-05

## 2024-11-05 ENCOUNTER — OFFICE (OUTPATIENT)
Age: 74
End: 2024-11-05
Payer: COMMERCIAL

## 2024-11-05 ENCOUNTER — AMBULATORY SURGICAL CENTER (OUTPATIENT)
Dept: URBAN - METROPOLITAN AREA SURGERY 20 | Facility: SURGERY | Age: 74
End: 2024-11-05

## 2024-11-05 ENCOUNTER — OFFICE (OUTPATIENT)
Dept: URBAN - METROPOLITAN AREA PATHOLOGY 4 | Facility: PATHOLOGY | Age: 74
End: 2024-11-05
Payer: COMMERCIAL

## 2024-11-05 VITALS — HEIGHT: 64 IN | HEIGHT: 64 IN | HEIGHT: 64 IN | HEIGHT: 64 IN | HEIGHT: 64 IN | HEIGHT: 64 IN | HEIGHT: 64 IN

## 2024-11-05 DIAGNOSIS — D12.0 BENIGN NEOPLASM OF CECUM: ICD-10-CM

## 2024-11-05 DIAGNOSIS — K63.5 POLYP OF COLON: ICD-10-CM

## 2024-11-05 DIAGNOSIS — Z12.11 ENCOUNTER FOR SCREENING FOR MALIGNANT NEOPLASM OF COLON: ICD-10-CM

## 2024-11-05 DIAGNOSIS — K57.30 DIVERTICULOSIS OF LARGE INTESTINE WITHOUT PERFORATION OR ABS: ICD-10-CM

## 2024-11-05 LAB
GI HISTOLOGY: A. CECAL POLYP: (no result)
GI HISTOLOGY: PDF REPORT: (no result)
Lab: (no result)
Lab: (no result)

## 2024-11-05 PROCEDURE — 45380 COLONOSCOPY AND BIOPSY: CPT | Performed by: INTERNAL MEDICINE

## 2024-11-05 PROCEDURE — 88305 TISSUE EXAM BY PATHOLOGIST: CPT | Performed by: PATHOLOGY

## 2024-12-03 ENCOUNTER — TRANSCRIBE ORDERS (OUTPATIENT)
Dept: ADMINISTRATIVE | Facility: HOSPITAL | Age: 74
End: 2024-12-03
Payer: MEDICARE

## 2024-12-03 ENCOUNTER — LAB (OUTPATIENT)
Dept: LAB | Facility: HOSPITAL | Age: 74
End: 2024-12-03
Payer: MEDICARE

## 2024-12-03 DIAGNOSIS — E78.00 PURE HYPERCHOLESTEROLEMIA: ICD-10-CM

## 2024-12-03 DIAGNOSIS — R73.03 DIABETES MELLITUS, LATENT: ICD-10-CM

## 2024-12-03 DIAGNOSIS — R73.03 DIABETES MELLITUS, LATENT: Primary | ICD-10-CM

## 2024-12-03 LAB
ALBUMIN SERPL-MCNC: 4.5 G/DL (ref 3.5–5.2)
ALBUMIN/GLOB SERPL: 1.5 G/DL
ALP SERPL-CCNC: 61 U/L (ref 39–117)
ALT SERPL W P-5'-P-CCNC: 23 U/L (ref 1–33)
ANION GAP SERPL CALCULATED.3IONS-SCNC: 6.8 MMOL/L (ref 5–15)
AST SERPL-CCNC: 26 U/L (ref 1–32)
BILIRUB SERPL-MCNC: 0.5 MG/DL (ref 0–1.2)
BUN SERPL-MCNC: 17 MG/DL (ref 8–23)
BUN/CREAT SERPL: 16.8 (ref 7–25)
CALCIUM SPEC-SCNC: 9.6 MG/DL (ref 8.6–10.5)
CHLORIDE SERPL-SCNC: 102 MMOL/L (ref 98–107)
CHOLEST SERPL-MCNC: 195 MG/DL (ref 0–200)
CO2 SERPL-SCNC: 29.2 MMOL/L (ref 22–29)
CREAT SERPL-MCNC: 1.01 MG/DL (ref 0.57–1)
EGFRCR SERPLBLD CKD-EPI 2021: 58.2 ML/MIN/1.73
GLOBULIN UR ELPH-MCNC: 3.1 GM/DL
GLUCOSE SERPL-MCNC: 114 MG/DL (ref 65–99)
HBA1C MFR BLD: 6 % (ref 4.8–5.6)
HDLC SERPL QL: 3.25
HDLC SERPL-MCNC: 60 MG/DL (ref 40–60)
LDLC SERPL CALC-MCNC: 108 MG/DL (ref 0–100)
POTASSIUM SERPL-SCNC: 4.8 MMOL/L (ref 3.5–5.2)
PROT SERPL-MCNC: 7.6 G/DL (ref 6–8.5)
SODIUM SERPL-SCNC: 138 MMOL/L (ref 136–145)
TRIGL SERPL-MCNC: 154 MG/DL (ref 0–150)
VLDLC SERPL-MCNC: 27 MG/DL (ref 5–40)

## 2024-12-03 PROCEDURE — 36415 COLL VENOUS BLD VENIPUNCTURE: CPT

## 2024-12-03 PROCEDURE — 80061 LIPID PANEL: CPT

## 2024-12-03 PROCEDURE — 80053 COMPREHEN METABOLIC PANEL: CPT

## 2024-12-03 PROCEDURE — 83036 HEMOGLOBIN GLYCOSYLATED A1C: CPT

## 2024-12-04 ENCOUNTER — TRANSCRIBE ORDERS (OUTPATIENT)
Dept: LAB | Facility: HOSPITAL | Age: 74
End: 2024-12-04
Payer: MEDICARE

## 2024-12-04 DIAGNOSIS — E78.00 PURE HYPERCHOLESTEROLEMIA: ICD-10-CM

## 2024-12-04 DIAGNOSIS — N18.31 STAGE 3A CHRONIC KIDNEY DISEASE: ICD-10-CM

## 2024-12-04 DIAGNOSIS — R73.03 DIABETES MELLITUS, LATENT: Primary | ICD-10-CM

## 2024-12-12 ENCOUNTER — TRANSCRIBE ORDERS (OUTPATIENT)
Dept: ADMINISTRATIVE | Facility: HOSPITAL | Age: 74
End: 2024-12-12
Payer: MEDICARE

## 2024-12-12 DIAGNOSIS — Z87.891 PERSONAL HISTORY OF TOBACCO USE, PRESENTING HAZARDS TO HEALTH: Primary | ICD-10-CM

## 2025-01-14 ENCOUNTER — HOSPITAL ENCOUNTER (OUTPATIENT)
Dept: CT IMAGING | Facility: HOSPITAL | Age: 75
Discharge: HOME | End: 2025-01-14
Admitting: INTERNAL MEDICINE
Payer: MEDICARE

## 2025-01-14 DIAGNOSIS — Z87.891 PERSONAL HISTORY OF TOBACCO USE, PRESENTING HAZARDS TO HEALTH: ICD-10-CM

## 2025-01-14 PROCEDURE — 71271 CT THORAX LUNG CANCER SCR C-: CPT

## 2025-06-10 ENCOUNTER — LAB (OUTPATIENT)
Dept: LAB | Facility: HOSPITAL | Age: 75
End: 2025-06-10
Payer: MEDICARE

## 2025-06-10 ENCOUNTER — TRANSCRIBE ORDERS (OUTPATIENT)
Dept: ADMINISTRATIVE | Facility: HOSPITAL | Age: 75
End: 2025-06-10
Payer: MEDICARE

## 2025-06-10 DIAGNOSIS — R73.03 DIABETES MELLITUS, LATENT: ICD-10-CM

## 2025-06-10 DIAGNOSIS — I10 MALIGNANT HYPERTENSION: ICD-10-CM

## 2025-06-10 DIAGNOSIS — R73.03 DIABETES MELLITUS, LATENT: Primary | ICD-10-CM

## 2025-06-10 LAB
ALBUMIN SERPL-MCNC: 4.8 G/DL (ref 3.5–5.2)
ALBUMIN/GLOB SERPL: 1.7 G/DL
ALP SERPL-CCNC: 61 U/L (ref 39–117)
ALT SERPL W P-5'-P-CCNC: 29 U/L (ref 1–33)
ANION GAP SERPL CALCULATED.3IONS-SCNC: 11.2 MMOL/L (ref 5–15)
AST SERPL-CCNC: 27 U/L (ref 1–32)
BASOPHILS # BLD AUTO: 0.09 10*3/MM3 (ref 0–0.2)
BASOPHILS NFR BLD AUTO: 1 % (ref 0–1.5)
BILIRUB SERPL-MCNC: 0.5 MG/DL (ref 0–1.2)
BUN SERPL-MCNC: 15.2 MG/DL (ref 8–23)
BUN/CREAT SERPL: 15.5 (ref 7–25)
CALCIUM SPEC-SCNC: 10 MG/DL (ref 8.6–10.5)
CHLORIDE SERPL-SCNC: 102 MMOL/L (ref 98–107)
CO2 SERPL-SCNC: 24.8 MMOL/L (ref 22–29)
CREAT SERPL-MCNC: 0.98 MG/DL (ref 0.57–1)
DEPRECATED RDW RBC AUTO: 43.5 FL (ref 37–54)
EGFRCR SERPLBLD CKD-EPI 2021: 59.9 ML/MIN/1.73
EOSINOPHIL # BLD AUTO: 0.27 10*3/MM3 (ref 0–0.4)
EOSINOPHIL NFR BLD AUTO: 2.9 % (ref 0.3–6.2)
ERYTHROCYTE [DISTWIDTH] IN BLOOD BY AUTOMATED COUNT: 12.9 % (ref 12.3–15.4)
GLOBULIN UR ELPH-MCNC: 2.9 GM/DL
GLUCOSE SERPL-MCNC: 114 MG/DL (ref 65–99)
HBA1C MFR BLD: 5.6 % (ref 4.8–5.6)
HCT VFR BLD AUTO: 41.3 % (ref 34–46.6)
HGB BLD-MCNC: 14.1 G/DL (ref 12–15.9)
IMM GRANULOCYTES # BLD AUTO: 0.04 10*3/MM3 (ref 0–0.05)
IMM GRANULOCYTES NFR BLD AUTO: 0.4 % (ref 0–0.5)
LYMPHOCYTES # BLD AUTO: 2.17 10*3/MM3 (ref 0.7–3.1)
LYMPHOCYTES NFR BLD AUTO: 23.7 % (ref 19.6–45.3)
MCH RBC QN AUTO: 31.4 PG (ref 26.6–33)
MCHC RBC AUTO-ENTMCNC: 34.1 G/DL (ref 31.5–35.7)
MCV RBC AUTO: 92 FL (ref 79–97)
MONOCYTES # BLD AUTO: 0.8 10*3/MM3 (ref 0.1–0.9)
MONOCYTES NFR BLD AUTO: 8.7 % (ref 5–12)
NEUTROPHILS NFR BLD AUTO: 5.8 10*3/MM3 (ref 1.7–7)
NEUTROPHILS NFR BLD AUTO: 63.3 % (ref 42.7–76)
NRBC BLD AUTO-RTO: 0 /100 WBC (ref 0–0.2)
PLATELET # BLD AUTO: 277 10*3/MM3 (ref 140–450)
PMV BLD AUTO: 9.7 FL (ref 6–12)
POTASSIUM SERPL-SCNC: 4.4 MMOL/L (ref 3.5–5.2)
PROT SERPL-MCNC: 7.7 G/DL (ref 6–8.5)
RBC # BLD AUTO: 4.49 10*6/MM3 (ref 3.77–5.28)
SODIUM SERPL-SCNC: 138 MMOL/L (ref 136–145)
WBC NRBC COR # BLD AUTO: 9.17 10*3/MM3 (ref 3.4–10.8)

## 2025-06-10 PROCEDURE — 83036 HEMOGLOBIN GLYCOSYLATED A1C: CPT

## 2025-06-10 PROCEDURE — 80053 COMPREHEN METABOLIC PANEL: CPT

## 2025-06-10 PROCEDURE — 36415 COLL VENOUS BLD VENIPUNCTURE: CPT

## 2025-06-10 PROCEDURE — 85025 COMPLETE CBC W/AUTO DIFF WBC: CPT
